# Patient Record
Sex: FEMALE | Race: BLACK OR AFRICAN AMERICAN | HISPANIC OR LATINO | Employment: FULL TIME | URBAN - METROPOLITAN AREA
[De-identification: names, ages, dates, MRNs, and addresses within clinical notes are randomized per-mention and may not be internally consistent; named-entity substitution may affect disease eponyms.]

---

## 2017-06-15 ENCOUNTER — ALLSCRIPTS OFFICE VISIT (OUTPATIENT)
Dept: OTHER | Facility: OTHER | Age: 22
End: 2017-06-15

## 2017-06-15 DIAGNOSIS — R35.0 FREQUENCY OF MICTURITION: ICD-10-CM

## 2017-06-15 DIAGNOSIS — Z01.419 ENCOUNTER FOR GYNECOLOGICAL EXAMINATION WITHOUT ABNORMAL FINDING: ICD-10-CM

## 2017-06-20 ENCOUNTER — APPOINTMENT (OUTPATIENT)
Dept: LAB | Facility: HOSPITAL | Age: 22
End: 2017-06-20
Payer: COMMERCIAL

## 2017-06-20 DIAGNOSIS — R35.0 FREQUENCY OF MICTURITION: ICD-10-CM

## 2017-06-20 LAB
ALBUMIN SERPL BCP-MCNC: 3.9 G/DL (ref 3.5–5)
ALP SERPL-CCNC: 112 U/L (ref 46–116)
ALT SERPL W P-5'-P-CCNC: 22 U/L (ref 12–78)
ANION GAP SERPL CALCULATED.3IONS-SCNC: 6 MMOL/L (ref 4–13)
AST SERPL W P-5'-P-CCNC: 14 U/L (ref 5–45)
BACTERIA UR QL AUTO: ABNORMAL /HPF
BASOPHILS # BLD AUTO: 0.02 THOUSANDS/ΜL (ref 0–0.1)
BASOPHILS NFR BLD AUTO: 0 % (ref 0–1)
BILIRUB SERPL-MCNC: 0.5 MG/DL (ref 0.2–1)
BILIRUB UR QL STRIP: NEGATIVE
BUN SERPL-MCNC: 18 MG/DL (ref 5–25)
CALCIUM SERPL-MCNC: 8.9 MG/DL (ref 8.3–10.1)
CHLORIDE SERPL-SCNC: 103 MMOL/L (ref 100–108)
CLARITY UR: CLEAR
CO2 SERPL-SCNC: 30 MMOL/L (ref 21–32)
COLOR UR: YELLOW
CREAT SERPL-MCNC: 0.71 MG/DL (ref 0.6–1.3)
EOSINOPHIL # BLD AUTO: 0.08 THOUSAND/ΜL (ref 0–0.61)
EOSINOPHIL NFR BLD AUTO: 1 % (ref 0–6)
ERYTHROCYTE [DISTWIDTH] IN BLOOD BY AUTOMATED COUNT: 11.9 % (ref 11.6–15.1)
EST. AVERAGE GLUCOSE BLD GHB EST-MCNC: 111 MG/DL
GFR SERPL CREATININE-BSD FRML MDRD: >60 ML/MIN/1.73SQ M
GLUCOSE P FAST SERPL-MCNC: 94 MG/DL (ref 65–99)
GLUCOSE UR STRIP-MCNC: NEGATIVE MG/DL
HBA1C MFR BLD: 5.5 % (ref 4.2–6.3)
HCT VFR BLD AUTO: 40.2 % (ref 34.8–46.1)
HGB BLD-MCNC: 14.6 G/DL (ref 11.5–15.4)
HGB UR QL STRIP.AUTO: NORMAL
KETONES UR STRIP-MCNC: NEGATIVE MG/DL
LEUKOCYTE ESTERASE UR QL STRIP: NEGATIVE
LYMPHOCYTES # BLD AUTO: 2.33 THOUSANDS/ΜL (ref 0.6–4.47)
LYMPHOCYTES NFR BLD AUTO: 42 % (ref 14–44)
MCH RBC QN AUTO: 33.2 PG (ref 26.8–34.3)
MCHC RBC AUTO-ENTMCNC: 36.3 G/DL (ref 31.4–37.4)
MCV RBC AUTO: 91 FL (ref 82–98)
MONOCYTES # BLD AUTO: 0.43 THOUSAND/ΜL (ref 0.17–1.22)
MONOCYTES NFR BLD AUTO: 8 % (ref 4–12)
NEUTROPHILS # BLD AUTO: 2.66 THOUSANDS/ΜL (ref 1.85–7.62)
NEUTS SEG NFR BLD AUTO: 49 % (ref 43–75)
NITRITE UR QL STRIP: NEGATIVE
NON-SQ EPI CELLS URNS QL MICRO: ABNORMAL /HPF
NRBC BLD AUTO-RTO: 0 /100 WBCS
PH UR STRIP.AUTO: 7 [PH] (ref 4.5–8)
PLATELET # BLD AUTO: 298 THOUSANDS/UL (ref 149–390)
PMV BLD AUTO: 9.8 FL (ref 8.9–12.7)
POTASSIUM SERPL-SCNC: 4.4 MMOL/L (ref 3.5–5.3)
PROT SERPL-MCNC: 7.8 G/DL (ref 6.4–8.2)
PROT UR STRIP-MCNC: NEGATIVE MG/DL
RBC # BLD AUTO: 4.4 MILLION/UL (ref 3.81–5.12)
RBC #/AREA URNS AUTO: ABNORMAL /HPF
SODIUM SERPL-SCNC: 139 MMOL/L (ref 136–145)
SP GR UR STRIP.AUTO: 1.01 (ref 1–1.03)
UROBILINOGEN UR QL STRIP.AUTO: 0.2 E.U./DL
WBC # BLD AUTO: 5.52 THOUSAND/UL (ref 4.31–10.16)
WBC #/AREA URNS AUTO: ABNORMAL /HPF

## 2017-06-20 PROCEDURE — 81001 URINALYSIS AUTO W/SCOPE: CPT

## 2017-06-20 PROCEDURE — 36415 COLL VENOUS BLD VENIPUNCTURE: CPT

## 2017-06-20 PROCEDURE — 80053 COMPREHEN METABOLIC PANEL: CPT

## 2017-06-20 PROCEDURE — 83036 HEMOGLOBIN GLYCOSYLATED A1C: CPT

## 2017-06-20 PROCEDURE — 85025 COMPLETE CBC W/AUTO DIFF WBC: CPT

## 2017-06-23 ENCOUNTER — GENERIC CONVERSION - ENCOUNTER (OUTPATIENT)
Dept: OTHER | Facility: OTHER | Age: 22
End: 2017-06-23

## 2017-07-05 ENCOUNTER — ALLSCRIPTS OFFICE VISIT (OUTPATIENT)
Dept: OTHER | Facility: OTHER | Age: 22
End: 2017-07-05

## 2017-07-05 ENCOUNTER — LAB REQUISITION (OUTPATIENT)
Dept: LAB | Facility: HOSPITAL | Age: 22
End: 2017-07-05
Payer: COMMERCIAL

## 2017-07-05 DIAGNOSIS — Z11.3 ENCOUNTER FOR SCREENING FOR INFECTIONS WITH PREDOMINANTLY SEXUAL MODE OF TRANSMISSION: ICD-10-CM

## 2017-07-05 PROCEDURE — 87491 CHLMYD TRACH DNA AMP PROBE: CPT | Performed by: NURSE PRACTITIONER

## 2017-07-05 PROCEDURE — 87591 N.GONORRHOEAE DNA AMP PROB: CPT | Performed by: NURSE PRACTITIONER

## 2017-07-05 PROCEDURE — G0145 SCR C/V CYTO,THINLAYER,RESCR: HCPCS | Performed by: NURSE PRACTITIONER

## 2017-07-07 LAB
CHLAMYDIA DNA CVX QL NAA+PROBE: NORMAL
N GONORRHOEA DNA GENITAL QL NAA+PROBE: NORMAL

## 2017-07-13 LAB
LAB AP GYN PRIMARY INTERPRETATION: NORMAL
Lab: NORMAL

## 2018-01-11 NOTE — RESULT NOTES
Discussion/Summary     All lab results are normal   No diabetes noted  No urine infection  Verified Results  (1) CBC/PLT/DIFF 20Jun2017 08:09AM [place] [facility]   TW Order Number: [id number]     Test Name Result Flag Reference   WBC COUNT 5 52 Thousand/uL  4 31-10 16   RBC COUNT 4 40 Million/uL  3 81-5 12   HEMOGLOBIN 14 6 g/dL  11 5-15 4   HEMATOCRIT 40 2 %  34 8-46  1   MCV 91 fL  82-98   MCH 33 2 pg  26 8-34 3   MCHC 36 3 g/dL  31 4-37 4   RDW 11 9 %  11 6-15 1   MPV 9 8 fL  8 9-12 7   PLATELET COUNT 580 Thousands/uL  149-390   nRBC AUTOMATED 0 /100 WBCs     NEUTROPHILS RELATIVE PERCENT 49 %  43-75   LYMPHOCYTES RELATIVE PERCENT 42 %  14-44   MONOCYTES RELATIVE PERCENT 8 %  4-12   EOSINOPHILS RELATIVE PERCENT 1 %  0-6   BASOPHILS RELATIVE PERCENT 0 %  0-1   NEUTROPHILS ABSOLUTE COUNT 2 66 Thousands/? ??L  1 85-7 62   LYMPHOCYTES ABSOLUTE COUNT 2 33 Thousands/? ??L  0 60-4 47   MONOCYTES ABSOLUTE COUNT 0 43 Thousand/? ??L  0 17-1 22   EOSINOPHILS ABSOLUTE COUNT 0 08 Thousand/? ??L  0 00-0 61   BASOPHILS ABSOLUTE COUNT 0 02 Thousands/? ??L  0 00-0 10     (1) COMPREHENSIVE METABOLIC PANEL [id number] 26:03CA [employer]   TW Order Number: [id number]     Test Name Result Flag Reference   SODIUM 139 mmol/L  136-145   POTASSIUM 4 4 mmol/L  3 5-5 3   CHLORIDE 103 mmol/L  100-108   CARBON DIOXIDE 30 mmol/L  21-32   ANION GAP (CALC) 6 mmol/L  4-13   BLOOD UREA NITROGEN 18 mg/dL  5-25   CREATININE 0 71 mg/dL  0 60-1 30   Standardized to IDMS reference method   CALCIUM 8 9 mg/dL  8 3-10 1   BILI, TOTAL 0 50 mg/dL  0 20-1 00   ALK PHOSPHATAS 112 U/L     ALT (SGPT) 22 U/L  12-78   AST(SGOT) 14 U/L  5-45   ALBUMIN 3 9 g/dL  3 5-5 0   TOTAL PROTEIN 7 8 g/dL  6 4-8 2   eGFR Non-African American      >60 0 ml/min/1 73sq m   [facility] Disease Education Program recommendations are as follows:  GFR calculation is accurate only with a steady state creatinine  Chronic Kidney disease less than 60 ml/min/1 73 sq  meters  Kidney failure less than 15 ml/min/1 73 sq  meters  GLUCOSE FASTING 94 mg/dL  65-99     (1) HEMOGLOBIN A1C 20Jun2017 08:09AM Yue Santos    Order Number: [id number]     Test Name Result Flag Reference   HEMOGLOBIN A1C 5 5 %  4 2-6 3   EST  AVG   GLUCOSE 111 mg/dl       (1) URINALYSIS w URINE C/S REFLEX (will reflex a microscopy if leukocytes, occult blood, or nitrites are not within normal limits) 20Jun2017 08:09AM Yue Santos    Order Number: [id number]     Test Name Result Flag Reference   COLOR Yellow     CLARITY Clear     PH UA 7 0  4 5-8 0   LEUKOCYTE ESTERASE UA Negative  Negative   NITRITE UA Negative  Negative   PROTEIN UA Negative mg/dl  Negative   GLUCOSE UA Negative mg/dl  Negative   KETONES UA Negative mg/dl  Negative   UROBILINOGEN UA 0 2 E U /dl  0 2, 1 0 E U /dl   BILIRUBIN UA Negative  Negative   BLOOD UA Trace-Intact  Negative, Trace-Intact   SPECIFIC GRAVITY UA 1 015  1 003-1 030   BACTERIA Occasional /hpf  None Seen, Occasional   EPITHELIAL CELLS Occasional /hpf  None Seen, Occasional   RBC UA 1-2 /hpf A None Seen   WBC UA 0-1 /hpf A None Seen       Signatures   Electronically signed by : BRAXTON Kennedy; Jun 23 2017  4:50PM EST                       (Author)

## 2018-01-13 VITALS
DIASTOLIC BLOOD PRESSURE: 70 MMHG | SYSTOLIC BLOOD PRESSURE: 103 MMHG | WEIGHT: 135 LBS | BODY MASS INDEX: 25.49 KG/M2 | HEIGHT: 61 IN

## 2018-01-13 VITALS
HEIGHT: 61 IN | BODY MASS INDEX: 25.52 KG/M2 | SYSTOLIC BLOOD PRESSURE: 110 MMHG | HEART RATE: 86 BPM | RESPIRATION RATE: 19 BRPM | WEIGHT: 135.19 LBS | TEMPERATURE: 97.3 F | DIASTOLIC BLOOD PRESSURE: 66 MMHG | OXYGEN SATURATION: 100 %

## 2018-01-13 NOTE — PROGRESS NOTES
Assessment    1  Encounter for preventive health examination (V70 0) (Z00 00)   2  Urinary frequency (788 41) (R35 0)   3  Myopia of right eye (367 1) (H52 11)   4  's permit physical examination (V70 3) (Z02 4)   5  Cervical cancer screening (V76 2) (Z12 4)    Plan  Cervical cancer screening    · *1 - [address] [facility] [facility]  *  Status: Hold For - Scheduling   Requested for: 90XJD4637  Care Summary provided  : Yes  Health Maintenance    · Call [phone] if: You find a new or different kind of lump in your breast ;  Status:Complete;   Done: 04TMH1997   · Call [phone] if: You have any warning signs of skin cancer ; Status:Complete;    Done: 90ABF9286   · Always use a seat belt and shoulder strap when riding or driving a motor vehicle ;  Status:Complete;   Done: 03CAK5830   · Begin a limited exercise program ; Status:Complete;   Done: 76KOR1159   · Begin or continue regular aerobic exercise   Gradually work up to at least 3 sessions of 30  minutes of exercise a week ; Status:Complete;   Done: 27ULR2070   · Brush your teeth 3 times a day and floss at least once a day ; Status:Complete;   Done:  78XAI3088   · Decreasing the stress in your life may help your condition improve ; Status:Complete;    Done: 51FNL4301   · Eat a low fat and low cholesterol diet ; Status:Complete;   Done: 77NLL1560   · Keep a diary of when and what you eat ; Status:Complete;   Done: 49EXC9038   · Regular aerobic exercise can help reduce stress ; Status:Complete;   Done: 49TYP6483   · Some eating tips that can help you lose weight ; Status:Complete;   Done: 38KWD4491   · Stretch and warm up your muscles during the first 10 minutes , then cool down your  muscles for the last 10 minutes of exercise ; Status:Complete;   Done: 15MUP9361   · There are many ways to reduce your risk of catching or spreading a sexually transmitted  Infection ; Status:Complete;   Done: 72YCF9439   · There are ways to decrease your stress and improve your sense of well-being  We  encourage you to keep active and exercise regularly  Make time to take care of yourself  and participate in activities that you enjoy  Stay connected to friends and family that can  support and comfort you  If at any time you have thoughts of harming yourself or  someone else, contact us immediately ; Status:Active; Requested [facility]:71RWL9405;    · Use a sun block product with an SPF of 15 or more ; Status:Complete;   Done:  03VWB9396   · Vitamins can help you get daily requirements that your diet may not be giving you ;  Status:Complete;   Done: 20XZR0078   · We encourage all of our patients to exercise regularly  30 minutes of exercise or physical  activity five or more days a week is recommended for children and adults ;  Status:Complete;   Done: 63RSI1528   · We recommend regular contraceptive use to prevent an unplanned pregnancy ;  Status:Complete;   Done: 90FKM3229   · We recommend routine visits to a dentist ; Status:Complete;   Done: 33JFC6823   · We recommend that you bring your body mass index down to 26 ; Status:Complete;    Done: 81KKT9047   · Follow-up visit in 1 year Evaluation and Treatment  Follow-up  Status: Hold For -  Scheduling  Requested for: 26IEH8726  Urinary frequency    · (1) CBC/PLT/DIFF; Status:Active; Requested VUA:05AIT5988;    · (1) COMPREHENSIVE METABOLIC PANEL; Status:Active; Requested QZV:48GQN3537;    · (1) HEMOGLOBIN A1C; Status:Active; Requested PAQ:32JXL5141;    · (1) URINALYSIS w URINE C/S REFLEX (will reflex a microscopy if leukocytes, occult  blood, or nitrites are not within normal limits); Status:Active; Requested VJC:18BPY3915;     Discussion/Summary  health maintenance visit Currently, she eats a poor diet and has an inadequate exercise regimen  referred Breast cancer screening: monthly self breast exam was advised and breast cancer screening is not indicated   Colorectal cancer screening: colorectal cancer screening is not indicated  Osteoporosis screening: bone mineral density testing is not indicated  The patient declines immunizations  She was advised to be evaluated by an ophthalmologist and a dentist  Advice and education were given regarding nutrition, weight bearing exercise, reproductive health and cardiovascular risk reduction  Patient discussion: discussed with the patient  Discussed importance of a healthy diet and exercise  Will check lab tests regarding urinary frequency with family hx of diabetes  Referred to gyn for annual exam   's permit exam form completed  Follow up as needed  The patient was counseled regarding instructions for management, risk factor reductions, patient and family education, impressions  Possible side effects of new medications were reviewed with the patient/guardian today  The treatment plan was reviewed with the patient/guardian  The patient/guardian understands and agrees with the treatment plan      Chief Complaint  18 y/o physical, DL permit      History of Present Illness  HM, Adult Female: The patient is being seen for a health maintenance evaluation  The last health maintenance visit was >3 year(s) ago  General Health: The patient's health since the last visit is described as good  She has regular dental visits  She complains of vision problems (RIGHT EYE PROBLEMS)  Vision care includes an eye examination within the last year  Lifestyle:  She does not have a healthy diet  She has weight concerns  She does not exercise regularly  She does not use tobacco  She denies alcohol use  She denies drug use  Reproductive health:  she reports abnormal menses  Menstrual history: The cycles are irregular  she uses contraception  For contraception, she uses hormone implants  she is sexually active  pregnancy history:  Screening:   HPI: Here for well visit and 's permit exam  Having some increased frequency of urination  Denies dysuria, itching, vaginal discharge  Review of Systems    Constitutional: No fever, no chills, feels well, no tiredness, no recent weight gain or weight loss  Eyes: No complaints of eye pain, no red eyes, no eyesight problems, no discharge, no dry eyes, no itching of eyes  ENT: no complaints of earache, no loss of hearing, no nose bleeds, no nasal discharge, no sore throat, no hoarseness  Cardiovascular: No complaints of slow heart rate, no fast heart rate, no chest pain, no palpitations, no leg claudication, no lower extremity edema  Respiratory: No complaints of shortness of breath, no wheezing, no cough, no SOB on exertion, no orthopnea, no PND  Gastrointestinal: No complaints of abdominal pain, no constipation, no nausea or vomiting, no diarrhea, no bloody stools  Genitourinary: dysmenorrhea and urinary frequency  Musculoskeletal: No complaints of arthralgias, no myalgias, no joint swelling or stiffness, no limb pain or swelling  Integumentary: No complaints of skin rash or lesions, no itching, no skin wounds, no breast pain or lump  Neurological: No complaints of headache, no confusion, no convulsions, no numbness, no dizziness or fainting, no tingling, no limb weakness, no difficulty walking  Psychiatric: Not suicidal, no sleep disturbance, no anxiety or depression, no change in personality, no emotional problems  Endocrine: No complaints of proptosis, no hot flashes, no muscle weakness, no deepening of the voice, no feelings of weakness  Hematologic/Lymphatic: No complaints of swollen glands, no swollen glands in the neck, does not bleed easily, does not bruise easily  Active Problems    1  Bipolar disorder (296 80) (F31 9)   2  Birth control (V25 9) (Z30 9)   3  Contraceptives (V25 02)   4  Nexplanon insertion (V25 5) (Z30 017)   5  Nexplanon removal (V25 43) (Z30 46)   6  Right eye symptoms (379 90) (H57 9)   7   Uterine scar from previous  delivery, antepartum condition or complication   (565 95) (O34 21)    Past Medical History    · History of Encounter for routine postpartum follow-up (V24 2) (Z39 2)   · History of Fetal Growth Problem Suspected, Not Found (V89 04)   · History of bipolar disorder (V11 1) (Z86 59)   · History of candidal vulvovaginitis (V13 29) (Z86 19)   · History of obesity (V13 89) (Z86 39)   · History of paranasal sinus pain (V12 69) (Z87 09)   · History of Visit For: Prenatal Exam High-risk Young Multigravida (V23 84)    Surgical History    · History of  Section   · Contraceptives (V25 02)    Family History  Mother    · Family history of Diabetes Mellitus (V18 0)   · Denied: Family history of substance abuse  Father    · Denied: Family history of substance abuse   · Family history of Feeling Fine  Daughter    · Family history of Feeling Fine   · Family history of Feeling Fine  Sister    · Family history of Feeling Fine  Brother    · Family history of Diabetes Mellitus (V18 0)   · Family history of mental retardation (V18 4) (Z81 0)   · Family history of Feeling Fine   · Family history of Feeling Fine  Family History    · Family history of Diabetes Mellitus (V18 0)    Social History    · Denied: History of Alcohol Use (History)   · Has 2 children   · Never A Smoker   · No alcohol use   · No drug use   · No preference on Buddhist beliefs    Current Meds   1  Prenatal TABS; Therapy: ([facility]) to Recorded    Allergies    1  No Known Drug Allergies    2  No Known Environmental Allergies   3  No Known Food Allergies    Vitals   Recorded: 86XEF2683 05:58PM   Temperature 97 3 F   Heart Rate 86   Respiration 19   Systolic 031   Diastolic 66   Height 5 ft 1 in   Weight 135 lb 3 oz   BMI Calculated 25 54   BSA Calculated 1 6   O2 Saturation 100   LMP 73Vdx6007     Physical Exam    Constitutional   General appearance: No acute distress, well appearing and well nourished  Eyes   Conjunctiva and lids: No swelling, erythema or discharge      Pupils and irises: Equal, round and reactive to light  Ears, Nose, Mouth, and Throat   External inspection of ears and nose: Normal     Otoscopic examination: Tympanic membranes translucent with normal light reflex  Canals patent without erythema  Oropharynx: Normal with no erythema, edema, exudate or lesions  Pulmonary   Respiratory effort: No increased work of breathing or signs of respiratory distress  Auscultation of lungs: Clear to auscultation  Cardiovascular   Palpation of heart: Normal PMI, no thrills  Auscultation of heart: Normal rate and rhythm, normal S1 and S2, without murmurs  Examination of extremities for edema and/or varicosities: Normal     Abdomen   Abdomen: Non-tender, no masses  Liver and spleen: No hepatomegaly or splenomegaly  Lymphatic   Palpation of lymph nodes in neck: No lymphadenopathy  Musculoskeletal   Gait and station: Normal     Digits and nails: Normal without clubbing or cyanosis  Inspection/palpation of joints, bones, and muscles: Normal     Skin   Skin and subcutaneous tissue: Normal without rashes or lesions  Skin piercing was noted of the umbilicus and tongue  Neurologic   Cranial nerves: Cranial nerves 2-12 intact  Reflexes: 2+ and symmetric  Sensation: No sensory loss      Psychiatric   Orientation to person, place, and time: Normal     Mood and affect: Normal        Signatures   Electronically signed by : Paige Paul; Jun 16 2017  5:03AM EST                       (Author)    Electronically signed by : RAMIN Piña ; Jul 5 2017  2:31PM EST

## 2018-05-03 NOTE — PROGRESS NOTES
ASSESSMENT & PLAN: Wilma Castañeda is a 23 y o   here for Nexplanon removal      1  Nexplanon removed (left arm)   - Successfully removed in office  2  Desire for future pregnancy   - Encouraged PNV and regular unprotected intercourse  3  RTC   - F/u as needed for next annual exam    CC:  Annual Gynecologic Examination    HPI: Wilma Castañeda is a 23 y o  Merthjeffrey Drown (prior C/S x2) who presents for Nexplanon removal in her left arm  This is her 2nd Nexplanon and it was placed 16  She is getting it removed because she desires to have a 3rd child  She currently has 2 girls  History reviewed  No pertinent past medical history  History reviewed  No pertinent surgical history  Past OB/Gyn History:  OB History     No data available          Family History   Problem Relation Age of Onset    Diabetes Mother        Social History:  Social History     Social History    Marital status: Single     Spouse name: N/A    Number of children: N/A    Years of education: N/A     Occupational History    Not on file  Social History Main Topics    Smoking status: Never Smoker    Smokeless tobacco: Never Used    Alcohol use No    Drug use: No    Sexual activity: Yes     Partners: Male     Birth control/ protection: Implant      Comment: wishing to conceive     Other Topics Concern    Not on file     Social History Narrative    No narrative on file     Allergies   Allergen Reactions    Iodinated Diagnostic Agents Hives    Percocet [Oxycodone-Acetaminophen] Rash       Current Outpatient Prescriptions:     Etonogestrel (NEXPLANON) 76 MG IMPL, Inject under the skin, Disp: , Rfl:     Review of Systems:  A complete review of systems was performed and was negative, except as listed      Objective      /74   Wt 66 2 kg (146 lb)   LMP 2018   BMI 27 59 kg/m²     Remove and insert drug implant  Date/Time: 2018 10:43 AM  Performed by: Alexa Aguilar by: Verta First     Consent: Consent obtained:  Verbal and written    Consent given by:  Patient    Procedural risks discussed:  Damage to other organs, bleeding, death, failure rate, infection, repeat procedure, possible loss of function, possible conversion to open surgery and possible continued pain (permanent and or temporary damage to the left arm, digits, paresthesias, skin, hematoma formation, ecchymosis, paralysis)    Patient questions answered: yes      Patient agrees, verbalizes understanding, and wants to proceed: yes      Educational handouts given: yes      Instructions and paperwork completed: yes    Indication:     Indication: Presence of non-biodegradable drug delivery implant    Pre-procedure:     Pre-procedure timeout performed: yes      Prepped with: alcohol 70% and povidone-iodine      Local anesthetic:  Lidocaine 1%    The site was cleaned and prepped in a sterile fashion: yes    Procedure:     Procedure:  Removal    Left/right:  Left    Site was closed with steri-strips and pressure bandage applied: yes    Comments: The Nexplanon device was palpaped in the patient's left upper forearm  The skin was prepped with Alcohol swabs and Betadine  Lidocaine 1% was used to numb the skin superficially, above the Nexplanon device  A 5mm incision was made  The Nexplanon was pushed distally towards the incision but was not visualized  A second 5mm incision 1cm proximal to the first incision was made  The device was then visualized and removed easily with a hemostat  The incisions were cleaned again, dried, and steristrips were placed over the incisions and adequate hemostasis was noted  A pressure dressing was applied with bandage and gauged  The patient tolerated the procedure well and was advised to call if she had any problems such as hematoma formation, bruising, fevers, chills, N&V, pain, and or/bleeding  She was instructed to keep the bandage on for 24h and take tylenol and/or motrin for swelling and pain      Dr Flaco Long was present and assisted with the procedure      Jaki Carney DO  PGY-1 OB/GYN   5/8/2018 10:50 AM

## 2018-05-08 ENCOUNTER — OFFICE VISIT (OUTPATIENT)
Dept: OBGYN CLINIC | Facility: CLINIC | Age: 23
End: 2018-05-08
Payer: COMMERCIAL

## 2018-05-08 VITALS — DIASTOLIC BLOOD PRESSURE: 74 MMHG | SYSTOLIC BLOOD PRESSURE: 110 MMHG | BODY MASS INDEX: 27.59 KG/M2 | WEIGHT: 146 LBS

## 2018-05-08 DIAGNOSIS — Z30.46 ENCOUNTER FOR NEXPLANON REMOVAL: Primary | ICD-10-CM

## 2018-05-08 PROBLEM — H52.11 MYOPIA OF RIGHT EYE: Status: ACTIVE | Noted: 2017-06-15

## 2018-05-08 PROBLEM — R35.0 INCREASED FREQUENCY OF URINATION: Status: ACTIVE | Noted: 2017-06-15

## 2018-05-08 PROCEDURE — 11982 REMOVE DRUG IMPLANT DEVICE: CPT | Performed by: OBSTETRICS & GYNECOLOGY

## 2018-11-13 ENCOUNTER — ULTRASOUND (OUTPATIENT)
Dept: OBGYN CLINIC | Facility: CLINIC | Age: 23
End: 2018-11-13
Payer: COMMERCIAL

## 2018-11-13 VITALS
HEART RATE: 81 BPM | SYSTOLIC BLOOD PRESSURE: 113 MMHG | DIASTOLIC BLOOD PRESSURE: 68 MMHG | BODY MASS INDEX: 24.03 KG/M2 | WEIGHT: 127.2 LBS

## 2018-11-13 DIAGNOSIS — N91.2 AMENORRHEA: Primary | ICD-10-CM

## 2018-11-13 DIAGNOSIS — O21.9 NAUSEA AND VOMITING IN PREGNANCY: ICD-10-CM

## 2018-11-13 DIAGNOSIS — Z3A.08 8 WEEKS GESTATION OF PREGNANCY: ICD-10-CM

## 2018-11-13 LAB — SL AMB POCT URINE HCG: POSITIVE

## 2018-11-13 PROCEDURE — 99214 OFFICE O/P EST MOD 30 MIN: CPT | Performed by: OBSTETRICS & GYNECOLOGY

## 2018-11-13 PROCEDURE — 81025 URINE PREGNANCY TEST: CPT | Performed by: OBSTETRICS & GYNECOLOGY

## 2018-11-13 RX ORDER — DIPHENHYDRAMINE HYDROCHLORIDE 25 MG/1
25 CAPSULE ORAL 3 TIMES DAILY PRN
Qty: 30 TABLET | Refills: 3 | Status: SHIPPED | OUTPATIENT
Start: 2018-11-13 | End: 2022-02-18

## 2018-11-13 NOTE — PATIENT INSTRUCTIONS
Acute Nausea and Vomiting   WHAT YOU NEED TO KNOW:   Acute nausea and vomiting start suddenly, worsen quickly, and last a short time  DISCHARGE INSTRUCTIONS:   Seek care immediately if:   · You see blood in your vomit or your bowel movements  · You have sudden, severe pain in your chest and upper abdomen after hard vomiting or retching  · You have swelling in your neck and chest      · You are dizzy, cold, and thirsty and your eyes and mouth are dry  · You are urinating very little or not at all  · You have muscle weakness, leg cramps, and trouble breathing  · Your heart is beating much faster than normal      · You continue to vomit for more than 48 hours  Contact your healthcare provider if:   · You have frequent dry heaves (vomiting but nothing comes out)  · Your nausea and vomiting does not get better or go away after you use medicine  · You have questions or concerns about your condition or treatment  Medicines: You may need any of the following:  · Medicines  may be given to calm your stomach and stop your vomiting  You may also need medicines to help you feel more relaxed or to stop nausea and vomiting caused by motion sickness  · Gastrointestinal stimulants  are used to help empty your stomach and bowels  This may help decrease nausea and vomiting  · Take your medicine as directed  Contact your healthcare provider if you think your medicine is not helping or if you have side effects  Tell him or her if you are allergic to any medicine  Keep a list of the medicines, vitamins, and herbs you take  Include the amounts, and when and why you take them  Bring the list or the pill bottles to follow-up visits  Carry your medicine list with you in case of an emergency  Prevent or manage acute nausea and vomiting:   · Do not drink alcohol  Alcohol may upset or irritate your stomach  Too much alcohol can also cause acute nausea and vomiting  · Control stress    Headaches due to stress may cause nausea and vomiting  Find ways to relax and manage your stress  Get more rest and sleep  · Drink more liquids as directed  Vomiting can lead to dehydration  It is important to drink more liquids to help replace lost body fluids  Ask your healthcare provider how much liquid to drink each day and which liquids are best for you  Your provider may recommend that you drink an oral rehydration solution (ORS)  ORS contains water, salts, and sugar that are needed to replace the lost body fluids  Ask what kind of ORS to use, how much to drink, and where to get it  · Eat smaller meals, more often  Eat small amounts of food every 2 to 3 hours, even if you are not hungry  Food in your stomach may decrease your nausea  · Talk to your healthcare provider before you take over-the-counter (OTC) medicines  These medicines can cause serious problems if you use certain other medicines, or you have a medical condition  You may have problems if you use too much or use them for longer than the label says  Follow directions on the label carefully  Follow up with your healthcare provider as directed:  Write down your questions so you remember to ask them during your visits  © 2017 [facility] Information is for End User's use only and may not be sold, redistributed or otherwise used for commercial purposes  All illustrations and images included in CareNotes® are the copyrighted property of [employer] , [employer]  or Babar Harrison  The above information is an  only  It is not intended as medical advice for individual conditions or treatments  Talk to your doctor, nurse or pharmacist before following any medical regimen to see if it is safe and effective for you

## 2018-11-13 NOTE — PROGRESS NOTES
Jasmina Colon is a 23 y o   @ [address] [place] [address] by LMP 9/15/18 MALATHI 18    Chief complaint today: nausea, here for viability scan, desires permanent sterilization in future    Vitals:    18 0817   BP: 113/68   Pulse: 81     Viability scan  FHR 188bpm, yolk scan, gestational sac and IUP visualized  CRL 1 58-1 77cm consistent with an 8w0d gestation  Will date via LMP as only 2d difference  B6 and [employer], PNV to pt's pharmacy  To return in 1 week for prenatal intake    Previous C-S x2, briefly discussed need for a 3rd c/s    Bipin Oneal,   PGY-2 OB/GYN   2018 8:43 AM

## 2018-11-26 ENCOUNTER — INITIAL PRENATAL (OUTPATIENT)
Dept: OBGYN CLINIC | Facility: CLINIC | Age: 23
End: 2018-11-26
Payer: COMMERCIAL

## 2018-11-26 ENCOUNTER — APPOINTMENT (OUTPATIENT)
Dept: LAB | Facility: HOSPITAL | Age: 23
End: 2018-11-26
Payer: COMMERCIAL

## 2018-11-26 VITALS
BODY MASS INDEX: 24.47 KG/M2 | WEIGHT: 129.6 LBS | HEART RATE: 93 BPM | SYSTOLIC BLOOD PRESSURE: 109 MMHG | DIASTOLIC BLOOD PRESSURE: 74 MMHG | HEIGHT: 61 IN

## 2018-11-26 DIAGNOSIS — Z3A.10 10 WEEKS GESTATION OF PREGNANCY: ICD-10-CM

## 2018-11-26 DIAGNOSIS — Z3A.10 10 WEEKS GESTATION OF PREGNANCY: Primary | ICD-10-CM

## 2018-11-26 LAB
ABO GROUP BLD: NORMAL
BACTERIA UR QL AUTO: ABNORMAL /HPF
BASOPHILS # BLD AUTO: 0.03 THOUSANDS/ΜL (ref 0–0.1)
BASOPHILS NFR BLD AUTO: 0 % (ref 0–1)
BILIRUB UR QL STRIP: NEGATIVE
BLD GP AB SCN SERPL QL: NEGATIVE
CLARITY UR: CLEAR
COLOR UR: YELLOW
EOSINOPHIL # BLD AUTO: 0.06 THOUSAND/ΜL (ref 0–0.61)
EOSINOPHIL NFR BLD AUTO: 1 % (ref 0–6)
ERYTHROCYTE [DISTWIDTH] IN BLOOD BY AUTOMATED COUNT: 12.2 % (ref 11.6–15.1)
GLUCOSE 1H P 50 G GLC PO SERPL-MCNC: 88 MG/DL
GLUCOSE UR STRIP-MCNC: NEGATIVE MG/DL
HBV SURFACE AG SER QL: NORMAL
HCT VFR BLD AUTO: 39.6 % (ref 34.8–46.1)
HGB BLD-MCNC: 13.5 G/DL (ref 11.5–15.4)
HGB UR QL STRIP.AUTO: ABNORMAL
IMM GRANULOCYTES # BLD AUTO: 0.04 THOUSAND/UL (ref 0–0.2)
IMM GRANULOCYTES NFR BLD AUTO: 1 % (ref 0–2)
KETONES UR STRIP-MCNC: NEGATIVE MG/DL
LEUKOCYTE ESTERASE UR QL STRIP: NEGATIVE
LYMPHOCYTES # BLD AUTO: 2.15 THOUSANDS/ΜL (ref 0.6–4.47)
LYMPHOCYTES NFR BLD AUTO: 27 % (ref 14–44)
MCH RBC QN AUTO: 32.5 PG (ref 26.8–34.3)
MCHC RBC AUTO-ENTMCNC: 34.1 G/DL (ref 31.4–37.4)
MCV RBC AUTO: 95 FL (ref 82–98)
MONOCYTES # BLD AUTO: 0.63 THOUSAND/ΜL (ref 0.17–1.22)
MONOCYTES NFR BLD AUTO: 8 % (ref 4–12)
NEUTROPHILS # BLD AUTO: 4.96 THOUSANDS/ΜL (ref 1.85–7.62)
NEUTS SEG NFR BLD AUTO: 63 % (ref 43–75)
NITRITE UR QL STRIP: NEGATIVE
NON-SQ EPI CELLS URNS QL MICRO: ABNORMAL /HPF
NRBC BLD AUTO-RTO: 0 /100 WBCS
PH UR STRIP.AUTO: 6 [PH] (ref 4.5–8)
PLATELET # BLD AUTO: 308 THOUSANDS/UL (ref 149–390)
PMV BLD AUTO: 9.6 FL (ref 8.9–12.7)
PROT UR STRIP-MCNC: NEGATIVE MG/DL
RBC # BLD AUTO: 4.16 MILLION/UL (ref 3.81–5.12)
RBC #/AREA URNS AUTO: ABNORMAL /HPF
RH BLD: POSITIVE
RUBV IGG SERPL IA-ACNC: 10.8 IU/ML
SP GR UR STRIP.AUTO: 1.01 (ref 1–1.03)
SPECIMEN EXPIRATION DATE: NORMAL
UROBILINOGEN UR QL STRIP.AUTO: 1 E.U./DL
WBC # BLD AUTO: 7.87 THOUSAND/UL (ref 4.31–10.16)
WBC #/AREA URNS AUTO: ABNORMAL /HPF

## 2018-11-26 PROCEDURE — 36415 COLL VENOUS BLD VENIPUNCTURE: CPT

## 2018-11-26 PROCEDURE — 82950 GLUCOSE TEST: CPT

## 2018-11-26 PROCEDURE — 81001 URINALYSIS AUTO W/SCOPE: CPT

## 2018-11-26 PROCEDURE — T1001 NURSING ASSESSMENT/EVALUATN: HCPCS

## 2018-11-26 PROCEDURE — 87086 URINE CULTURE/COLONY COUNT: CPT

## 2018-11-26 PROCEDURE — 83020 HEMOGLOBIN ELECTROPHORESIS: CPT

## 2018-11-26 PROCEDURE — 80081 OBSTETRIC PANEL INC HIV TSTG: CPT

## 2018-11-26 NOTE — PROGRESS NOTES
OB Intake  o Patient presents for OB intake interview  o Accompanied by: Self  o   o Hx of  delivery prior to 36 weeks 6 days: no  o LMP: Patient's last menstrual period was 2018   o U/S date: 18   - 8 weeks  0 days  o Estimated Date of Delivery: 19   - confirmed by U/S  o Signs and Symptoms of pregnancy:  - nausea  - Constipation: no  - Headaches: no  - Cramping/spotting: no  - PICA cravings: no  - Diabetes: If you answer yes, please order 1 hr gtt testing, 50grams   History of gestational diabetes yes   BMI >35  no   Advance maternal age >35 no   First degree relative with type 2 diabetes yes   History of PCOS no   Current metformin use no   Prior history of macrosomia or LGA no  o Immunization Record  -   - There is no immunization history on file for this patient   o Tdap:  - Counseled to be given after 28 weeks  - Influenza vaccine discussed   Vaccinated: No  o MRSA questionnaire: negative  o Dental visit within last 6 months  - yes    Nurse Family Partnership: No  ONAF form submitted: No    Interview education:  Ashok Moore Pregnancy Essentials booklet given to patient  Reviewed and explained   Handouts given at todays visit  o [employer] phone application guide  o [facility] support center  o CDCs Response to [address] [facility] [address] [place] [facility]  - Sequential screening pamphlet  - Cystic fibrosis pamphlet  o MALATHI letter given  [facility] [address]  - Work         Scripts given for prenatal panel, hemoglobin electrophoresis, and 1 hour glucose    Patient denied genetic testing    Undecided on influenza vaccine   CDC brochure provided to patient        Interview done by: Carlton Noble RN 18

## 2018-11-26 NOTE — PATIENT INSTRUCTIONS
Pregnancy   WHAT YOU NEED TO KNOW:   What do I need to know about pregnancy? A normal pregnancy lasts about 40 weeks  The first trimester lasts from your last period through the 12th week of pregnancy  The second trimester lasts from the 13th week of your pregnancy through the 23rd week  The third trimester lasts from your 24th week of pregnancy until your baby is born  If you know the date of your last period, your healthcare provider can estimate your due date  You may give birth to your baby any time from 37 weeks to 2 weeks after your due date  What is prenatal care? Prenatal care is a series of visits with your healthcare provider throughout your pregnancy  Prenatal care can help prevent problems during pregnancy and childbirth  At each prenatal visit, your healthcare provider will weigh you and check your blood pressure  Your healthcare provider will also check your baby's heartbeat and growth  You may also need the following at some visits:  · A pelvic exam  allows your healthcare provider to see your cervix (the bottom part of your uterus)  Your healthcare provider uses a speculum to gently open your vagina  He will check the size and shape of your uterus  · Blood tests  may be done to check for gestational diabetes and anemia (low iron level)  You may need other blood tests, such as blood type, Rh factor, or tests to check for birth defects  · A fetal ultrasound  shows pictures of your baby inside your uterus  It shows your baby's development  The movement and position of your baby can also be seen  Your healthcare provider may be able to tell you what your baby's gender is during the ultrasound  What can I do to have a healthy pregnancy? · Eat a variety of healthy foods  Healthy foods include fruits, vegetables, whole-grain breads, low-fat dairy foods, beans, lean meats, and fish  Drink liquids as directed  Ask how much liquid to drink each day and which liquids are best for you   Limit caffeine to less than 200 milligrams each day  Limit your intake of fish to 2 servings each week  Choose fish low in mercury such as canned light tuna, shrimp, crab, salmon, cod, or tilapia  Do not  eat fish high in mercury such as swordfish, tilefish, sarah mackerel, and shark  · Take prenatal vitamins as directed  Your need for certain vitamins and minerals, such as folic acid, increases during pregnancy  Prenatal vitamins provide some of the extra vitamins and minerals you need  Prenatal vitamins may also help to decrease the risk of certain birth defects  · Ask how much weight you should gain during your pregnancy  Too much or too little weight gain can be unhealthy for you and your baby  · Talk to your healthcare provider about exercise  Moderate exercise can help you stay fit  Your healthcare provider will help you plan an exercise program that is safe for you during pregnancy  · Do not smoke  If you smoke, it is never too late to quit  Smoking increases your risk of a miscarriage and other health problems during your pregnancy  Smoking can cause your baby to be born too early or weigh less at birth  Ask your healthcare provider for information if you need help quitting  · Do not drink alcohol  Alcohol passes from your body to your baby through the placenta  It can affect your baby's brain development and cause fetal alcohol syndrome (FAS)  FAS is a group of conditions that causes mental, behavior, and growth problems  · Talk to your healthcare provider before you take any medicines  Many medicines may harm your baby if you take them when you are pregnant  Do not take any medicines, vitamins, herbs, or supplements without first talking to your healthcare provider  Never use illegal or street drugs (such as marijuana or cocaine) while you are pregnant  What body changes may happen during my pregnancy?    · Breast changes  you will experience include tenderness and tingling during the early part of your pregnancy  Your breasts will become larger  You may need to use a support bra  You may see a thin, yellow fluid, called colostrum, leak from your nipples during the second trimester  Colostrum is a liquid that changes to milk about 3 days after you give birth  · Skin changes and stretch marks  may occur during your pregnancy  You may have red marks, called stretch marks, on your skin  Stretch marks will usually fade after pregnancy  Use lotion if your skin is dry and itchy  The skin on your face, around your nipples, and below your belly button may darken  Most of the time, your skin will return to its normal color after your baby is born  · Morning sickness  is nausea and vomiting that can happen at any time of day  Avoid fatty and spicy foods  Eat small meals throughout the day instead of large meals  Sadie may help to decrease nausea  Ask your healthcare provider about other ways of decreasing nausea and vomiting  · Heartburn  may be caused by changes in your hormones during pregnancy  Your growing uterus may also push your stomach upward and force stomach acid to back up into your esophagus  Eat 4 or 5 small meals each day instead of large meals  Avoid spicy foods  Avoid eating right before bedtime  · Constipation  may develop during your pregnancy  To treat constipation, eat foods high in fiber such as fiber cereals, beans, fruits, vegetables, whole-grain breads, and prune juice  Get regular exercise and drink plenty of water  Your healthcare provider may also suggest a fiber supplement to soften your bowel movements  Talk to your healthcare provider before you use any medicines to decrease constipation  · Hemorrhoids  are enlarged veins in the rectal area  They may cause pain, itching, and bright red bleeding from your rectum  To decrease your risk of hemorrhoids, prevent constipation and do not strain to have a bowel movement   If you have hemorrhoids, soak in a tub of warm water to ease discomfort  Ask your healthcare provider how you can treat hemorrhoids  · Leg cramps and swelling  may be caused by low calcium levels or the added weight of pregnancy  Raise your legs above the level of your heart to decrease swelling  During a leg cramp, stretch or massage the muscle that has the cramp  Heat may help decrease pain and muscle spasms  Apply heat on your muscle for 20 to 30 minutes every 2 hours for as many days as directed  · Back pain  may occur as your baby grows  Do not stand for long periods of time or lift heavy items  Use good posture while you stand, squat, or bend  Wear low-heeled shoes with good support  Rest may also help to relieve back pain  Ask your healthcare provider about exercises you can do to strengthen your back muscles  What are some safety tips during pregnancy? · Avoid hot tubs and saunas  Do not use a hot tub or sauna while you are pregnant, especially during your first trimester  Hot tubs and saunas may raise your baby's temperature and increase the risk of birth defects  · Avoid toxoplasmosis  This is an infection caused by eating raw meat or being around infected cat feces  It can cause birth defects, miscarriages, and other problems  Wash your hands after you touch raw meat  Make sure any meat is well-cooked before you eat it  Avoid raw eggs and unpasteurized milk  Use gloves or ask someone else to clean your cat's litter box while you are pregnant  · Ask your healthcare provider about travel  The most comfortable time to travel is during the second trimester  Ask your healthcare provider if you can travel after 36 weeks  You may not be able to travel in an airplane after 36 weeks  He may also recommend that you avoid long road trips  When should I seek immediate care? · You develop a severe headache that does not go away  · You have new or increased vision changes, such as blurred or spotted vision      · You have new or increased swelling in your face or hands  · You have pain or cramping in your abdomen or low back  · You have vaginal bleeding  When should I contact my healthcare provider? · You have abdominal cramps, pressure, or tightening  · You have a change in vaginal discharge  · You cannot keep food or drinks down, and you are losing weight  · You have chills or a fever  · You have vaginal itching, burning, or pain  · You have yellow, green, white, or foul-smelling vaginal discharge  · You have pain or burning when you urinate, less urine than usual, or pink or bloody urine  · You have questions or concerns about your condition or care  CARE AGREEMENT:   You have the right to help plan your care  Learn about your health condition and how it may be treated  Discuss treatment options with your caregivers to decide what care you want to receive  You always have the right to refuse treatment  The above information is an  only  It is not intended as medical advice for individual conditions or treatments  Talk to your doctor, nurse or pharmacist before following any medical regimen to see if it is safe and effective for you  © 2017 [address]  Information is for End User's use only and may not be sold, redistributed or otherwise used for commercial purposes  All illustrations and images included in CareNotes® are the copyrighted property of [employer]  or Babar Harrison

## 2018-11-27 LAB — RPR SER QL: NORMAL

## 2018-11-28 LAB
BACTERIA UR CULT: NORMAL
DEPRECATED HGB OTHER BLD-IMP: 0 %
HGB A MFR BLD: 2.4 % (ref 1.8–3.2)
HGB A MFR BLD: 97.6 % (ref 96.4–98.8)
HGB C MFR BLD: 0 %
HGB F MFR BLD: 0 % (ref 0–2)
HGB FRACT BLD-IMP: NORMAL
HGB S BLD QL SOLY: NEGATIVE
HGB S MFR BLD: 0 %
HIV 1+2 AB+HIV1 P24 AG SERPL QL IA: NORMAL

## 2018-12-06 ENCOUNTER — ROUTINE PRENATAL (OUTPATIENT)
Dept: OBGYN CLINIC | Facility: CLINIC | Age: 23
End: 2018-12-06
Payer: COMMERCIAL

## 2018-12-06 VITALS — SYSTOLIC BLOOD PRESSURE: 114 MMHG | WEIGHT: 129 LBS | BODY MASS INDEX: 24.37 KG/M2 | DIASTOLIC BLOOD PRESSURE: 75 MMHG

## 2018-12-06 DIAGNOSIS — Z34.91 PRENATAL CARE IN FIRST TRIMESTER: Primary | ICD-10-CM

## 2018-12-06 DIAGNOSIS — Z3A.11 11 WEEKS GESTATION OF PREGNANCY: ICD-10-CM

## 2018-12-06 PROCEDURE — 99214 OFFICE O/P EST MOD 30 MIN: CPT | Performed by: OBSTETRICS & GYNECOLOGY

## 2018-12-06 PROCEDURE — 87591 N.GONORRHOEAE DNA AMP PROB: CPT | Performed by: OBSTETRICS & GYNECOLOGY

## 2018-12-06 PROCEDURE — 87491 CHLMYD TRACH DNA AMP PROBE: CPT | Performed by: OBSTETRICS & GYNECOLOGY

## 2018-12-06 NOTE — PROGRESS NOTES
OB/GYN  PRENATAL H&P VISIT  Theodore Bailey  2018  10:03 AM      SUBJECTIVE  23yo  at 11w5d  Patient is here for initial prenatal H&P  This is an unintended pregnancy  Patient reports that her LMP was 9/15/18  Patient is currently doing well, had some nausea and vomiting that has improved now  She is here with her two children  She has a hx of 2 previous c-sections, 5y and 7y ago  She currently works at [employer] as a cook  She has a support system at home  She denies hx of STD/STI, denies a hx of TB or close contacts with persons with TB  She denies a family history of inheritable conditions such as physical or intellectual disabilities, birth defects, blood disorders, heart or neural tube defects  She never had MRSA  She denies recent travel or travel planned in the near future  She denies use of nicotine or recreational drug use  She denies vaginal bleeding, cramping, leakage, abnormal discharge  Patient is interested in a tubal ligation during her repeat   Paperwork not signed today since MALATHI is end of   Review of Systems   Gastrointestinal: Negative for abdominal pain, nausea and vomiting  Genitourinary: Negative for vaginal bleeding and vaginal pain  Neurological: Negative for headaches  Past Medical History:   Diagnosis Date    Varicella     as child        No past surgical history on file  Social History     Social History    Marital status: Single     Spouse name: N/A    Number of children: N/A    Years of education: N/A     Occupational History    Not on file       Social History Main Topics    Smoking status: Never Smoker    Smokeless tobacco: Never Used    Alcohol use No    Drug use: No    Sexual activity: Yes     Partners: Male     Birth control/ protection: Implant      Comment: wishing to conceive     Other Topics Concern    Not on file     Social History Narrative    No narrative on file       OBJECTIVE  Vitals:    18 0928   BP: 114/75 Physical Exam   Constitutional: She is oriented to person, place, and time  She appears well-developed and well-nourished  No distress  HENT:   Head: Normocephalic and atraumatic  Cardiovascular: Normal rate, regular rhythm and normal heart sounds  Pulmonary/Chest: Effort normal and breath sounds normal  No respiratory distress  She has no wheezes  Abdominal: Soft  She exhibits no distension  Neurological: She is alert and oriented to person, place, and time  Vulva: normal  Vagina: normal mucosa, normal discharge  Cervix: no cervical motion tenderness and no lesions       ASSESSMENT AND PLAN    23 y o , [id number], with /75   Wt 58 5 kg (129 lb)   LMP 09/15/2018 (Exact Date) , at [address] here for her prenatal H&P       1  Pregnancy: H&P completed today  PN Labs reviewed today  Final dating via LMP  Labor expectations discussed with patient, including appointment schedule, nutrition, weight gain, exercise, medications, sexual intercourse, and nausea/vomiting  2  Screening: Pap smear not collected, she had one less than 3 yrs ago, NILM  GC/CT collected  Sequential screening reviewed with patient - will proceed with undecided  Reviewed carrier screening for cystic fibrosis, hemoglobinapathies, Fragile X, and somatic muscular atrophy  3  Consents: Delivery process including potential OVD and  reviewed  Consents signed today  4  Labor: Patient to be scheduled for repeat   5  Postpartum: Patient plans on breastfeeding  Different methods of contraception were discussed with patient, including progesterone only oral pills, depo provera, nexplanon, mirena, and paragard  Patient would like to use BTL during postpartum phase  6  Follow up: RTC in 4 weeks  Precautions regarding labor, leakage, bleeding, and fetal movement reviewed      D/w Dr Hoa Rosas MD  2018  10:03 AM

## 2018-12-07 LAB
C TRACH DNA SPEC QL NAA+PROBE: NEGATIVE
N GONORRHOEA DNA SPEC QL NAA+PROBE: NEGATIVE

## 2019-01-09 ENCOUNTER — ROUTINE PRENATAL (OUTPATIENT)
Dept: OBGYN CLINIC | Facility: CLINIC | Age: 24
End: 2019-01-09

## 2019-01-09 VITALS
SYSTOLIC BLOOD PRESSURE: 109 MMHG | DIASTOLIC BLOOD PRESSURE: 70 MMHG | BODY MASS INDEX: 25.32 KG/M2 | WEIGHT: 134 LBS | HEART RATE: 91 BPM

## 2019-01-09 DIAGNOSIS — Z3A.16 16 WEEKS GESTATION OF PREGNANCY: Primary | ICD-10-CM

## 2019-01-09 DIAGNOSIS — Z34.92 PRENATAL CARE IN SECOND TRIMESTER: ICD-10-CM

## 2019-01-09 PROBLEM — R35.0 INCREASED FREQUENCY OF URINATION: Status: RESOLVED | Noted: 2017-06-15 | Resolved: 2019-01-09

## 2019-01-09 PROBLEM — Z30.46 ENCOUNTER FOR NEXPLANON REMOVAL: Status: RESOLVED | Noted: 2018-05-08 | Resolved: 2019-01-09

## 2019-01-09 PROCEDURE — 99213 OFFICE O/P EST LOW 20 MIN: CPT | Performed by: NURSE PRACTITIONER

## 2019-01-09 NOTE — PATIENT INSTRUCTIONS
Make appointment for Level II U/S  Call with vaginal bleeding, loss of fluid, regular contractions,  other needs or concerns    Return in 4 weeks

## 2019-01-09 NOTE — PROGRESS NOTES
Assessment & Plan  20 y o  [id number] at 16w4d presenting for routine prenatal visit  Declined genetic testing  Provided slip for Level II U/S    Problem List Items Addressed This Visit        Other    16 weeks gestation of pregnancy - Primary    Relevant Orders    Ambulatory Referral to Maternal Fetal Medicine    Prenatal care in second trimester    Relevant Orders    Ambulatory Referral to Maternal Fetal Medicine        ____________________________________________________________  Subjective  She is without complaint  She denies contractions, loss of fluid, or vaginal bleeding  She feels regular fetal movements  Objective  /70   Pulse 91   Wt 60 8 kg (134 lb)   LMP 09/15/2018 (Exact Date)   BMI 25 32 kg/m²   FHR: 150     Patient's Active Problem List  Patient Active Problem List   Diagnosis    Uterine scar from previous  delivery, antepartum condition or complication    Right eye symptoms    Bipolar disorder ([address] )    Myopia of right eye    16 weeks gestation of pregnancy    Prenatal care in second trimester     Plan  Make appointment for Level II U/S  To call with vaginal bleeding, loss of fluid, regular contractions,  other needs or concerns  Return in 4 weeks  Pt verbalized understanding of all discussed

## 2019-01-16 ENCOUNTER — HOSPITAL ENCOUNTER (EMERGENCY)
Facility: HOSPITAL | Age: 24
Discharge: HOME | End: 2019-01-16
Attending: EMERGENCY MEDICINE | Admitting: EMERGENCY MEDICINE
Payer: COMMERCIAL

## 2019-01-16 VITALS
OXYGEN SATURATION: 100 % | WEIGHT: 134.48 LBS | HEART RATE: 69 BPM | TEMPERATURE: 97.2 F | DIASTOLIC BLOOD PRESSURE: 53 MMHG | BODY MASS INDEX: 25.41 KG/M2 | SYSTOLIC BLOOD PRESSURE: 104 MMHG | RESPIRATION RATE: 18 BRPM

## 2019-01-16 DIAGNOSIS — K08.89 TOOTHACHE: Primary | ICD-10-CM

## 2019-01-16 PROCEDURE — 99282 EMERGENCY DEPT VISIT SF MDM: CPT

## 2019-01-16 RX ORDER — PENICILLIN V POTASSIUM 500 MG/1
500 TABLET ORAL 4 TIMES DAILY
Qty: 28 TABLET | Refills: 0 | Status: SHIPPED | OUTPATIENT
Start: 2019-01-16 | End: 2019-01-23

## 2019-01-16 NOTE — ED PROVIDER NOTES
History  Chief Complaint   Patient presents with    Dental Pain     2 weeks of intermittent right lower tooth pain  Reports right ear pain as well  Tylenol with no relief yesterday  No meds PTA  Approx 17 weeks preg       History provided by:  Patient   used: No    Dental Pain   Location:  Lower  Lower teeth location:  31/RL 2nd molar  Quality:  Aching  Severity:  Moderate  Onset quality:  Gradual  Duration: several weeks  Timing:  Constant  Progression:  Worsening  Chronicity:  New  Context: dental caries    Context: not abscess, not dental fracture, not enamel fracture, filling intact and not trauma    Previous work-up:  Filled cavity  Relieved by:  Nothing  Worsened by:  Jaw movement, pressure and touching  Ineffective treatments:  Acetaminophen  Associated symptoms: facial pain    Associated symptoms: no difficulty swallowing, no drooling, no facial swelling, no fever, no gum swelling, no headaches, no neck pain, no neck swelling and no trismus    Associated symptoms comment:  Right jaw pain  Patient is 17 weeks pregnant  Prior to Admission Medications   Prescriptions Last Dose Informant Patient Reported? Taking? Prenatal MV & Min w/FA-DHA (PRENATAL ADULT GUMMY/DHA/FA) 0 4-25 MG CHEW   No No   Sig: Chew 1 tablet daily   Pyridoxine HCl (VITAMIN B-6) 25 MG tablet   No No   Sig: Take 1 tablet (25 mg total) by mouth 3 (three) times a day as needed (nausea)   Patient not taking: Reported on 12/6/2018    doxylamine (UNISON) 25 MG tablet   No No   Sig: Take 1 tablet (25 mg total) by mouth daily at bedtime as needed for sleep or nausea   Patient not taking: Reported on 12/6/2018       Facility-Administered Medications: None       Past Medical History:   Diagnosis Date    Varicella     as child        History reviewed  No pertinent surgical history      Family History   Problem Relation Age of Onset    Diabetes Mother     No Known Problems Father     Diabetes Brother      I have reviewed and agree with the history as documented  Social History   Substance Use Topics    Smoking status: Never Smoker    Smokeless tobacco: Never Used    Alcohol use No        Review of Systems   Constitutional: Negative for chills and fever  HENT: Positive for dental problem and ear pain  Negative for drooling, facial swelling, sore throat and trouble swallowing  Respiratory: Negative for chest tightness  Musculoskeletal: Negative for neck pain  Neurological: Negative for headaches  Physical Exam  Physical Exam   Constitutional: She appears well-developed  No distress  HENT:   Head: Normocephalic and atraumatic  Right Ear: Hearing, tympanic membrane and external ear normal    Left Ear: Hearing and external ear normal    Nose: Nose normal    Mouth/Throat: Oropharynx is clear and moist and mucous membranes are normal  No oropharyngeal exudate  Eyes: Conjunctivae are normal  Right eye exhibits no discharge  Left eye exhibits no discharge  Neck: Normal range of motion  Neck supple  No JVD present  No tracheal deviation present  Cardiovascular: Normal rate  Pulmonary/Chest: Effort normal  No stridor  No respiratory distress  Lymphadenopathy:     She has no cervical adenopathy  Neurological: No cranial nerve deficit  Skin: Skin is warm  No rash noted  She is not diaphoretic  Psychiatric: She has a normal mood and affect  Nursing note and vitals reviewed  No jaw swelling her external tenderness    Change of appear normal     Vital Signs  ED Triage Vitals   Temperature Pulse Respirations Blood Pressure SpO2   01/16/19 0846 01/16/19 0846 01/16/19 0846 01/16/19 0848 01/16/19 0846   (!) 97 2 °F (36 2 °C) 69 18 104/53 100 %      Temp Source Heart Rate Source Patient Position - Orthostatic VS BP Location FiO2 (%)   01/16/19 0846 01/16/19 0846 -- -- --   Oral Monitor         Pain Score       01/16/19 0846       Worst Possible Pain           Vitals:    01/16/19 0846 01/16/19 0848   BP: 104/53   Pulse: 69        Visual Acuity      ED Medications  Medications - No data to display    Diagnostic Studies  Results Reviewed     None                 No orders to display              Procedures  Procedures       Phone Contacts  ED Phone Contact    ED Course                               MDM  CritCare Time    Disposition  Final diagnoses:   Toothache     Time reflects when diagnosis was documented in both MDM as applicable and the Disposition within this note     Time User Action Codes Description Comment    1/16/2019  9:24 AM Libia Reyes [[id number] [id number]] Toothache       ED Disposition     ED Disposition Condition Comment    Discharge  Annell Laser discharge to home/self care  Condition at discharge: Good        Follow-up Information     Follow up With Specialties Details Why Liliane  Schedule an appointment as soon as possible for a visit in 1 week  [address]  [facility] [address]  [phone]          Discharge Medication List as of 1/16/2019  9:25 AM      START taking these medications    Details   penicillin V potassium (VEETID) 500 mg tablet Take 1 tablet (500 mg total) by mouth 4 (four) times a day for 7 days, Starting Wed 1/16/2019, Until Wed 1/23/2019, Print         CONTINUE these medications which have NOT CHANGED    Details   doxylamine (UNISON) 25 MG tablet Take 1 tablet (25 mg total) by mouth daily at bedtime as needed for sleep or nausea, Starting Tue 11/13/2018, Normal      Prenatal MV & Min w/FA-DHA (PRENATAL ADULT GUMMY/DHA/FA) 0 4-25 MG CHEW Chew 1 tablet daily, Starting Tue 11/13/2018, Normal      Pyridoxine HCl (VITAMIN B-6) 25 MG tablet Take 1 tablet (25 mg total) by mouth 3 (three) times a day as needed (nausea), Starting Tue 11/13/2018, Normal           No discharge procedures on file      ED Provider  Electronically Signed by           Heather Pak MD  01/16/19 8754

## 2019-01-16 NOTE — DISCHARGE INSTRUCTIONS
Dolor de [employer]   LO QUE NECESITA SABER:   Un dolor de SMITH'S GREEN que es causado por anival inflamación del nervio en el centro del diente  La irritación puede ser causada por varios problemas, wilfredo por caries, anival infección, un diente vencido o enfermedad de las encías  Es muy importante que acuda a anival elizabeth de control con aparicio odontólogo para que le puedan diagnosticar la causa de aparicio dolor de SMITH'S GREEN y recibir tratamiento  Lo cual puede ayudar a prevenir problemas serios  Steven Arenas EL ADAM HOSPITALARIA:   Medicamentos:  Es posible que usted necesite alguno de los siguientes:  · AINEs (Analgésicos antiinflamatorios no esteroides)  disminuyen la inflamación y el dolor  Rosa medicamento se puede comprar con o sin receta médica  Rosa medicamento puede causar sangrado estomacal o problemas en los riñones en ciertas personas  Si usted paul un medicamento anticoagulante, asegúrese de preguntarle a aparicio médico si los VESTA son seguros para usted  Jennifer siempre la etiqueta y siga cuidadosamente las instrucciones antes de usar rosa medicamento  · El acetaminofén  [employer]  Está disponible sin receta médica  Pregunte la cantidad y la frecuencia con que debe tomarlos  [address]  El acetaminofén puede causar daño en el [place] cuando no se paul de forma correcta  · Los analgésicos  aparicio forma de presentación puede ser en píldora o wilfredo un medicamento que se aplica directamente en el diente o las encías  No espere hasta que el dolor sea severo antes de evelin rosa medicamento  · Antibióticos  contribuyen a combatir o evitar que el arabella contraiga anival infección causada por anival bacteria  Tómelos dennis [employer]  · Carlton milind medicamentos wilfredo se le haya indicado  Consulte con aparicio médico si usted bernarda que aparicio medicamento no le está ayudando o si presenta efectos secundarios  Infórmele si es alérgico a algún medicamento   Mantenga anival lista actualizada de los [place], las vitaminas y los productos herbales que paul  Incluya los siguientes datos de los medicamentos: cantidad, frecuencia y motivo de administración  Traiga con usted la lista o los envases de la píldoras a milind citas de seguimiento  Lleve la lista de los medicamentos con usted en elif de anival emergencia  Programe anival elizabeth de seguimiento con flower dentista dennis wilfredo se le indica:  Es posible que lo refieran a un odontólogo cirujano  Anote milind preguntas para que se acuerde de hacerlas vasquez milidn visitas  Cuidados personales:   · Enjuague la boca con agua tibia con sal 4 veces al día o según las indicaciones  · Es posible que necesite comer alimentos blandos para aliviar el dolor que le causa masticar  Comuníquese con flower dentista si:   · Usted tiene preguntas o inquietudes acerca de flower condición o cuidado  Regrese a la darren de emergencias si:   · Usted tiene dificultad para respirar  · Usted tiene flower jamia o ashley inflamados  · Usted tiene fiebre o escalofríos  · Usted tiene dificultad para hablar o tragar  · Usted tiene dificultad para abrir o cerrar la boca  © 2017 [address] Information is for End User's use only and may not be sold, redistributed or otherwise used for commercial purposes  All illustrations and images included in CareNotes® are the copyrighted property of A D A M , Inc  or Babar Harrison  Esta información es sólo para uso en educación  Flower intención no es darle un consejo médico sobre enfermedades o tratamientos  Colsulte con flower Luh Shouts farmacéutico antes de seguir cualquier régimen médico para saber si es seguro y efectivo para usted

## 2021-08-02 ENCOUNTER — OFFICE VISIT (OUTPATIENT)
Dept: FAMILY MEDICINE CLINIC | Facility: CLINIC | Age: 26
End: 2021-08-02

## 2021-08-02 ENCOUNTER — TELEPHONE (OUTPATIENT)
Dept: FAMILY MEDICINE CLINIC | Facility: CLINIC | Age: 26
End: 2021-08-02

## 2021-08-02 DIAGNOSIS — B34.9 VIRAL ILLNESS: Primary | ICD-10-CM

## 2021-08-02 PROCEDURE — 99442 PR PHYS/QHP TELEPHONE EVALUATION 11-20 MIN: CPT | Performed by: INTERNAL MEDICINE

## 2021-08-02 NOTE — ASSESSMENT & PLAN NOTE
-onset of symptoms 3 days ago: headaches  -Today morning had symptoms of stomach pain at work and vomiting, due to which patient was sent home   -No diarrhea, No fever, no body aches  -No other symptoms reported  -She has been able to eat soups, liquids  -has used aleve for headaches  -Feels symptoms of belly aches- in middle of st[place]; no prior similar symptoms before   -she has been able to eat some soups  -states that needs medical evaluation prior to return to work  -No one other sick jeffrey miller  -works at [employer] works- No other sicks at work that she knows of  -Had Mariah -- last year, 5/2020; had fever at that time   -Declines covid test at this time   -Needs letter for work, provided in chart  -if symptoms still persists after 2 days, notify back and consider testing for COVID, patient verbalized understanding

## 2021-08-02 NOTE — LETTER
August 2, 2021     Patient: Tello Little   YOB: 1995   Date of Visit: 8/2/2021       To Whom it May Concern:    Briana Bertrand is under my professional care  She was seen in my office on 8/2/2021  She may return to work on 08/04/2021  If you have any questions or concerns, please don't hesitate to call           Sincerely,          Maddie Rivera MD        CC: No Recipients

## 2021-08-02 NOTE — PROGRESS NOTES
Virtual Brief Visit    Verification of patient location: [place], [place]    Patient is located in the following state in which I hold an active license [place]      Assessment/Plan:    Problem List Items Addressed This Visit        Other    Viral illness - Primary     -onset of symptoms 3 days ago: headaches  -Today morning had symptoms of stomach pain at work and vomiting, due to which patient was sent home   -No diarrhea, No fever, no body aches  -No other symptoms reported  -She has been able to eat soups, liquids  -has used aleve for headaches  -Feels symptoms of belly aches- in middle of [place]; no prior similar symptoms before   -she has been able to eat some soups  -states that needs medical evaluation prior to return to work  -No one other sick a paul  -works at [employer] works- No other sicks at work that she knows of  -Had Mariah -- last year, 5/2020; had fever at that time   -Declines covid test at this time   -Needs letter for work, provided in chart  -if symptoms still persists after 2 days, notify back and consider testing for COVID, patient verbalized understanding                      Reason for visit is   Chief Complaint   Patient presents with    Virtual Brief Visit        Encounter provider Glory Yen MD    Provider located at [address] [address] [phone]    Recent Visits  No visits were found meeting these conditions  Showing recent visits within past 7 days and meeting all other requirements  Today's Visits  Date Type Provider Dept   08/02/21 Telephone MD Dayanara Hoffmann [facility]   08/02/21 Office Visit MD Dayanara Hoffmann [facility]   Showing today's visits and meeting all other requirements  Future Appointments  No visits were found meeting these conditions    Showing future appointments within next 150 days and meeting all other requirements       After connecting through telephone, the patient was identified by name and date of birth  Stefanie rEwin was informed that this is a telemedicine visit and that the visit is being conducted through telephone  My office door was closed  No one else was in the room  She acknowledged consent and understanding of privacy and security of the platform  The patient has agreed to participate and understands she can discontinue the visit at any time  Patient is aware this is a billable service  It was my intent to perform this visit via video technology but the patient was not able to do a video connection so the visit was completed via audio telephone only  Nuzhat Zhong is a 30 y o  female  Called via televisit for evaluation after symptoms of abdominal discomfort, associated with episode of vomiting today at work  Patient was sent home from work and indicated to have medical evaluation prior to be cleared to return to work  Past Medical History:   Diagnosis Date    Varicella     as child        No past surgical history on file  Current Outpatient Medications   Medication Sig Dispense Refill    doxylamine (UNISON) 25 MG tablet Take 1 tablet (25 mg total) by mouth daily at bedtime as needed for sleep or nausea (Patient not taking: Reported on 12/6/2018 ) 30 tablet 1    Prenatal MV & Min w/FA-DHA (PRENATAL ADULT GUMMY/DHA/FA) 0 4-25 MG CHEW Chew 1 tablet daily 30 tablet 4    Pyridoxine HCl (VITAMIN B-6) 25 MG tablet Take 1 tablet (25 mg total) by mouth 3 (three) times a day as needed (nausea) (Patient not taking: Reported on 12/6/2018 ) 30 tablet 3     No current facility-administered medications for this visit  Allergies   Allergen Reactions    Latex Rash    Iodinated Diagnostic Agents Hives    Percocet [Oxycodone-Acetaminophen] Rash       Review of Systems   Constitutional: Negative for fever  Respiratory: Negative for cough, shortness of breath and wheezing      Cardiovascular: Negative for chest pain, palpitations and leg swelling  Gastrointestinal: Positive for abdominal pain, nausea and vomiting  Neurological: Positive for headaches  Psychiatric/Behavioral: The patient is not nervous/anxious  There were no vitals filed for this visit  I spent 15 minutes directly with the patient during this visit    VIRTUAL VISIT [address] verbally agrees to participate in [employer]  Pt is aware that [employer] could be limited without vital signs or the ability to perform a full hands-on physical exam  Ame Villa understands she or the provider may request at any time to terminate the video visit and request the patient to seek care or treatment in person

## 2021-09-14 ENCOUNTER — TELEPHONE (OUTPATIENT)
Dept: FAMILY MEDICINE CLINIC | Facility: CLINIC | Age: 26
End: 2021-09-14

## 2021-09-14 NOTE — TELEPHONE ENCOUNTER
She doesn't currently have a PCP to write a letter under  Looks like she was seen for a same day visit virtual in August but never scheduled her NP visit so I am unable to provide a letter at this time   Please schedule a virtual visit

## 2021-09-14 NOTE — TELEPHONE ENCOUNTER
Pt would like to know if she can have an excuse for her to her job since her two daughters were sent to get covid tested and is currently waiting for results  Please advice  If any questions please contact pt

## 2022-02-17 ENCOUNTER — TELEPHONE (OUTPATIENT)
Dept: FAMILY MEDICINE CLINIC | Facility: CLINIC | Age: 27
End: 2022-02-17

## 2022-02-18 ENCOUNTER — OFFICE VISIT (OUTPATIENT)
Dept: FAMILY MEDICINE CLINIC | Facility: CLINIC | Age: 27
End: 2022-02-18

## 2022-02-18 VITALS
RESPIRATION RATE: 18 BRPM | HEIGHT: 62 IN | DIASTOLIC BLOOD PRESSURE: 60 MMHG | HEART RATE: 81 BPM | SYSTOLIC BLOOD PRESSURE: 114 MMHG | OXYGEN SATURATION: 99 % | WEIGHT: 132.9 LBS | TEMPERATURE: 97.6 F | BODY MASS INDEX: 24.46 KG/M2

## 2022-02-18 DIAGNOSIS — Z12.4 SCREENING FOR CERVICAL CANCER: ICD-10-CM

## 2022-02-18 DIAGNOSIS — Z11.3 ROUTINE SCREENING FOR STI (SEXUALLY TRANSMITTED INFECTION): ICD-10-CM

## 2022-02-18 DIAGNOSIS — G56.21 CUBITAL TUNNEL SYNDROME ON RIGHT: ICD-10-CM

## 2022-02-18 DIAGNOSIS — Z00.00 ENCOUNTER FOR MEDICAL EXAMINATION TO ESTABLISH CARE: Primary | ICD-10-CM

## 2022-02-18 DIAGNOSIS — M65.4 TENOSYNOVITIS, DE QUERVAIN: ICD-10-CM

## 2022-02-18 DIAGNOSIS — Z86.32 H/O GESTATIONAL DIABETES MELLITUS, NOT CURRENTLY PREGNANT: ICD-10-CM

## 2022-02-18 DIAGNOSIS — Z11.59 NEED FOR HEPATITIS C SCREENING TEST: ICD-10-CM

## 2022-02-18 DIAGNOSIS — Z13.0 SCREENING FOR DEFICIENCY ANEMIA: ICD-10-CM

## 2022-02-18 DIAGNOSIS — M79.641 HAND PAIN, RIGHT: ICD-10-CM

## 2022-02-18 PROBLEM — B34.9 VIRAL ILLNESS: Status: RESOLVED | Noted: 2021-08-02 | Resolved: 2022-02-18

## 2022-02-18 PROBLEM — Z3A.16 16 WEEKS GESTATION OF PREGNANCY: Status: RESOLVED | Noted: 2018-11-13 | Resolved: 2022-02-18

## 2022-02-18 PROBLEM — Z34.92 PRENATAL CARE IN SECOND TRIMESTER: Status: RESOLVED | Noted: 2019-01-09 | Resolved: 2022-02-18

## 2022-02-18 PROCEDURE — 99204 OFFICE O/P NEW MOD 45 MIN: CPT

## 2022-02-18 RX ORDER — NAPROXEN 500 MG/1
500 TABLET ORAL 3 TIMES DAILY PRN
Qty: 90 TABLET | Refills: 0 | Status: SHIPPED | OUTPATIENT
Start: 2022-02-18

## 2022-02-18 NOTE — ASSESSMENT & PLAN NOTE
Reviewed care gaps with pt & importance of completing all tasks for complete healthcare maintenance       Declined flu vaccine  Up to date on tdap

## 2022-02-18 NOTE — ASSESSMENT & PLAN NOTE
-Reports compression to ulnar nerve  -Reports numbness, tingling of 4th finger  -Night splint, elbow pad during the day  -Rest, PRN NSAIDs  -If conservative tx fails, will discuss EMG

## 2022-02-18 NOTE — ASSESSMENT & PLAN NOTE
-Conservative measures are indicated  -Utilize thumb spica splint & activity rest  -NSAIDs and ice massage  -Encouraged OT, pt agrees  -RTC if symptoms worsen or fail to improve  -If above is ineffective, will consider steroid injection

## 2022-02-18 NOTE — PROGRESS NOTES
Assessment/Plan:    Encounter for medical examination to establish care  Reviewed care gaps with pt & importance of completing all tasks for complete healthcare maintenance  Declined flu vaccine  Up to date on tdap    Hand pain, right        Tenosynovitis, de Quervain  -Conservative measures are indicated  -Utilize thumb spica splint & activity rest  -NSAIDs and ice massage  -Encouraged OT, pt agrees  -RTC if symptoms worsen or fail to improve  -If above is ineffective, will consider steroid injection  Cubital tunnel syndrome on right  -Reports compression to ulnar nerve  -Reports numbness, tingling of 4th finger  -Night splint, elbow pad during the day  -Rest, PRN NSAIDs  -If conservative tx fails, will discuss EMG  Diagnoses and all orders for this visit:    Encounter for medical examination to establish care    Need for hepatitis C screening test  -     Hepatitis C Antibody ([facility]); Future    Screening for cervical cancer  -     Ambulatory referral to Obstetrics / Gynecology; Future    Routine screening for STI (sexually transmitted infection)  -     RPR; Future    Screening for deficiency anemia  -     CBC and differential; Future    H/O gestational diabetes mellitus, not currently pregnant  -     Comprehensive metabolic panel; Future  -     HEMOGLOBIN A1C W/ EAG ESTIMATION; Future    Hand pain, right  -     Ambulatory Referral to Occupational Therapy; Future    Cubital tunnel syndrome on right  -     naproxen (Naprosyn) 500 mg tablet; Take 1 tablet (500 mg total) by mouth 3 (three) times a day as needed for moderate pain  -     Ambulatory Referral to Occupational Therapy; Future    Tenosynovitis, de Quervain          Subjective: 2   Patient ID: Luisa Matthew is a 27 y o  female  Luisa Matthew is a 27 y o  female  has a past medical history of gestational diabetes, Varicella  PSH of   Pt presents today to establish care           The following portions of the patient's history were reviewed and updated as appropriate: allergies, current medications, past family history, past medical history, past social history, past surgical history and problem list     Review of Systems   Constitutional: Negative for chills and fever  HENT: Negative for ear pain and sore throat  Eyes: Negative for pain and visual disturbance  Respiratory: Negative for cough and shortness of breath  Cardiovascular: Negative for chest pain and palpitations  Gastrointestinal: Negative for abdominal pain and vomiting  Genitourinary: Negative for dysuria and hematuria  Musculoskeletal: Positive for arthralgias and myalgias  Negative for back pain  Skin: Negative for color change and rash  Neurological: Positive for numbness  Negative for seizures and syncope  All other systems reviewed and are negative  Objective:      /60 (BP Location: Right arm, Patient Position: Sitting, Cuff Size: Standard)   Pulse 81   Temp 97 6 °F (36 4 °C) (Temporal)   Resp 18   Ht 5' 2" (1 575 m)   Wt 60 3 kg (132 lb 14 4 oz)   LMP 02/05/2022 (Exact Date)   SpO2 99%   Breastfeeding No   BMI 24 31 kg/m²          Physical Exam  Vitals and nursing note reviewed  HENT:      Head: Normocephalic and atraumatic  Right Ear: External ear normal       Left Ear: External ear normal       Nose: Nose normal    Eyes:      Extraocular Movements: Extraocular movements intact  Conjunctiva/sclera: Conjunctivae normal       Pupils: Pupils are equal, round, and reactive to light  Cardiovascular:      Rate and Rhythm: Normal rate and regular rhythm  Pulses: Normal pulses  Heart sounds: Normal heart sounds  Pulmonary:      Effort: Pulmonary effort is normal       Breath sounds: Normal breath sounds  Abdominal:      General: Bowel sounds are normal       Palpations: Abdomen is soft  Tenderness: There is no abdominal tenderness  Musculoskeletal:         General: Normal range of motion  Cervical back: Normal range of motion  Comments: (-) tinels, (-) phalens, (+) finkelstein (-) grind test       Skin:     General: Skin is warm and dry  Neurological:      General: No focal deficit present  Mental Status: She is alert and oriented to person, place, and time  Mental status is at baseline  Psychiatric:         Mood and Affect: Mood normal          Behavior: Behavior normal          Thought Content:  Thought content normal

## 2022-02-18 NOTE — PATIENT INSTRUCTIONS
Síndrome del túnel cubital   CUIDADO AMBULATORIO:   El síndrome del túnel cubital es anival afección que provoca la acumulación de presión en el nervio cubital del codo  El nervio cubital controla los músculos y la sensación en la mano  El síndrome del túnel cubital puede ser el resultado de presión [de-identified], estiramiento o anival disminución del flujo sanguíneo al nervio cubital   Los signos y síntomas más comunes incluyen los siguientes:  · Adormecimiento y hormigueo en el brazo o en la mano, por lo general en el dedo anular y el meñique    · Dolor en el codo que se extiende al antebrazo y la mano    · Debilidad en la mano y los dedos    · No poder estirar los dedos, por lo general el anular y el meñique    Busque atención médica de inmediato si:  · Pierde la sensación en la mano o los dedos de forma repentina  · No puede  el dedo anular o meñique  Llame a aparicio médico u ortopedista si:  · Yaneth síntomas Kaylen Passe  · Aparicio mano y yaneth dedos se debilitan tanto que no puede agarrar, apretar o [employer]  · Usted tiene preguntas o inquietudes acerca de aparicio condición o cuidado  El tratamiento podría no ser necesario  Si los síntomas continúan, es posible que necesite alguna de las siguientes opciones:  · [place], pueden disminuir la inflamación y el dolor o la fiebre  Rosa medicamento está disponible con o sin anival receta médica  Los VESTA pueden causar sangrado estomacal o problemas renales en ciertas personas  Si usted paul un medicamento anticoagulante, siempre pregúntele a aparicio médico si los VESTA son seguros para usted  Siempre dewayne la etiqueta de rosa medicamento y [place] instrucciones  · Anival inyección de esteroides ayuda a calmar el dolor y bajar la inflamación  · La cirugía puede ser necesaria si yaneth síntomas no mejoran en un plazo de 3 meses  La cirugía quitará la presión del nervio cubital  Aparicio cirujano podría  el nervio a otro lugar para que deje de estar estirado o comprimido   Mariam Memory parte del hueso si está presionando el nervio  El manejo de milind síntomas:  · No ponga presión en el codo  Ciertas posiciones ponen presión en el nervio cubital del codo  No se apoye sobre el codo  No duerma con el brazo por encima de la blas y el codo doblado  Duerma con el brazo recto, al lado del cuerpo  · Aplique hielo  El hielo ayuda a disminuir la inflamación y el dolor y previene el daño al tejido  Use anival compresa de hielo o ponga hielo triturado en anival bolsa de plástico  Madonna Mary Ellen la bolsa con anival toalla antes de aplicarla en la piel  Aplique hielo vasquez 15 a 20 minutos por hora o según indicaciones  · Descanse el brazo  Evite las actividades que causen los síntomas para permitir que el nervio sane  · Vaya a terapia física según indicaciones  Un fisioterapeuta puede enseñarle ejercicios para fortalecer y [employer]  La fisioterapia también puede ayudar a disminuir el dolor o pérdida de función  · Use anival férula o un soporte para el codo  La férula o el soporte ayudan a disminuir el movimiento del Ranelle Dragoon y a quitar la presión del nervio cubital  Aparicio médico le indicará cuánto tiempo y cómo usarlo cada día  Es posible que deba usarlo a la noche  Puede que también necesite coderas para proteger el codo  Acuda a milind consultas de control con aparicio médico u ortopedista según le indicaron: Anote milind preguntas para que se acuerde de hacerlas vasquez milind visitas  © Copyright [employer] 2021 Information is for End User's use only and may not be sold, redistributed or otherwise used for commercial purposes  All illustrations and images included in CareNotes® are the copyrighted property of A D A M , Inc  or [address] [place] [address] es sólo para uso en educación  Aparicio intención no es darle un consejo médico sobre enfermedades o tratamientos  Colsulte con aparicio Agustín Harada farmacéutico antes de seguir cualquier régimen médico para saber si es seguro y efectivo para usted

## 2022-08-18 ENCOUNTER — TELEPHONE (OUTPATIENT)
Dept: FAMILY MEDICINE CLINIC | Facility: CLINIC | Age: 27
End: 2022-08-18

## 2022-08-18 NOTE — TELEPHONE ENCOUNTER
called not a working number, need to reschedule appointment  couldn't leave a message- appt needed to be cx    Called 2nd number in chart also not a working number

## 2023-05-20 ENCOUNTER — ANNUAL EXAM (OUTPATIENT)
Dept: OBGYN CLINIC | Facility: CLINIC | Age: 28
End: 2023-05-20

## 2023-05-20 VITALS
HEART RATE: 73 BPM | BODY MASS INDEX: 31.53 KG/M2 | SYSTOLIC BLOOD PRESSURE: 119 MMHG | HEIGHT: 60 IN | WEIGHT: 160.6 LBS | DIASTOLIC BLOOD PRESSURE: 68 MMHG

## 2023-05-20 DIAGNOSIS — Z01.419 ENCOUNTER FOR ANNUAL ROUTINE GYNECOLOGICAL EXAMINATION: Primary | ICD-10-CM

## 2023-05-20 NOTE — PROGRESS NOTES
Annual Exam    Assessment   1  Encounter for annual routine gynecological examination  Liquid-based pap, screening        well woman       Plan       All questions answered  Breast self exam technique reviewed and patient encouraged to perform self-exam monthly  Contraception: tubal ligation  Discussed healthy lifestyle modifications  Follow up in 1 year  Thin prep Pap smear  Patient Instructions   PAP results can take up to weeks  Call with needs or concerns  Return in 1 year  Pt verbalized understanding of all discussed  Ab Miranda is a 32 y o   female who presents for annual well woman exam  Periods are regular every 28-30 days, lasting 5 days  No intermenstrual bleeding, spotting, or discharge  Last WNL PAP 2017  1 partner x 1 year, present for exam    Depression Screening Follow-up Plan: Patient's depression screening was negative with a PHQ-2 score of 4  Their PHQ-9 score was 8  Clinically patient does not have depression  No treatment is required  BMI Counseling: Body mass index is 31 37 kg/m²  The BMI is above normal  Nutrition recommendations include reducing portion sizes, decreasing overall calorie intake, 3-5 servings of fruits/vegetables daily, moderation in carbohydrate intake and increasing intake of lean protein  Exercise recommendations include moderate aerobic physical activity for 150 minutes/week  Current contraception: tubal ligation  History of abnormal Pap smear: no  Family history of uterine or ovarian cancer: no  Regular self breast exam: yes  History of abnormal mammogram: N/A  Family history of breast cancer: no  History of abnormal lipids: no  Menstrual History:  OB History        4    Para   3    Term   3            AB        Living   3       SAB        IAB        Ectopic        Multiple        Live Births   1                Menarche age: 4  Patient's last menstrual period was 2023 (approximate)         The following portions of the patient's history were reviewed and updated as appropriate: allergies, current medications, past family history, past medical history, past social history, past surgical history and problem list     Review of Systems  Pertinent items are noted in HPI        Objective      /68   Pulse 73   Ht 5' (1 524 m)   Wt 72 8 kg (160 lb 9 6 oz)   LMP 04/25/2023 (Approximate)   BMI 31 37 kg/m²     General: alert and oriented, in no acute distress, alert, appears stated age and cooperative   Heart: regular rate and rhythm, S1, S2 normal, no murmur, click, rub or gallop   Lungs: clear to auscultation bilaterally, WNL respiratory effort, negative cough or SOB   Thyroid: Negative masses   Abdomen: soft, non-tender, without masses or organomegaly   Vulva: normal   Vagina: normal mucosa   Cervix: no bleeding following Pap, no cervical motion tenderness and no lesions   Uterus: normal size, non-tender, normal shape and consistency   Adnexa: normal adnexa   Urethra: normal   Breasts: NT,negative masses, discharge, or dimpling

## 2023-05-23 LAB
HPV HR 12 DNA CVX QL NAA+PROBE: NEGATIVE
HPV16 DNA CVX QL NAA+PROBE: NEGATIVE
HPV18 DNA CVX QL NAA+PROBE: POSITIVE

## 2023-05-26 LAB
LAB AP GYN PRIMARY INTERPRETATION: NORMAL
Lab: NORMAL
PATH INTERP SPEC-IMP: NORMAL

## 2023-06-12 PROBLEM — R87.810 CERVICAL HIGH RISK HUMAN PAPILLOMAVIRUS (HPV) DNA TEST POSITIVE: Status: ACTIVE | Noted: 2023-06-12

## 2023-06-12 PROBLEM — Z00.00 ENCOUNTER FOR MEDICAL EXAMINATION TO ESTABLISH CARE: Status: RESOLVED | Noted: 2022-02-18 | Resolved: 2023-06-12

## 2023-06-12 NOTE — PROGRESS NOTES
Name: Junito Grullon      : 1995      MRN: [id number]  Encounter Provider: BRAXTON Hayward  Encounter Date: 2023   Encounter department: [address] [address]     1  's permit PE (physical examination)  Assessment & Plan:  Here today for a 's permit physical   Denies any history of seizures, neuro dx,  neuropsych dx, syncope, cardiac issues, htn, drug/ alcohol abuse, diabetes, physical disability and any conditions that cause a lapse of consciousness             2  Obesity (BMI 30-39  9)  Assessment & Plan:  BMI Counseling: Body mass index is 32 03 kg/m²  The BMI is above normal  Nutrition recommendations include reducing portion sizes, decreasing overall calorie intake, 3-5 servings of fruits/vegetables daily, reducing fast food intake, consuming healthier snacks, decreasing soda and/or juice intake, moderation in carbohydrate intake, increasing intake of lean protein, reducing intake of saturated fat and trans fat and reducing intake of cholesterol  Exercise recommendations include moderate aerobic physical activity for 150 minutes/week  Orders:  -     Basic metabolic panel; Future  -     Hemoglobin A1C; Future  -     Lipid Panel with Direct LDL reflex; Future  -     TSH, 3rd generation with Free T4 reflex; Future  -     CBC and differential; Future    3  Routine screening for STI (sexually transmitted infection)  -     Trichomonas Vaginalis, LUIS; Future  -     Chlamydia/GC amplified DNA by PCR; Future  -     RPR-Syphilis Screening (Total Syphilis IGG/IGM); Future  -     HIV 1/2 AG/AB w Reflex [facility] for 2 yr old and above; Future  -     Hepatitis panel, acute; Future         Subjective      Junito Grullon is a 32 y o  female  has a past medical history of Varicella  has a past surgical history that includes  section and Tubal ligation      She presents today for a 's physical    Review of Systems Constitutional: Negative for chills and fever  HENT: Negative for ear pain and sore throat  Eyes: Negative for pain and visual disturbance  Respiratory: Negative for cough and shortness of breath  Cardiovascular: Negative for chest pain and palpitations  Gastrointestinal: Negative for abdominal pain and vomiting  Genitourinary: Negative for dysuria and hematuria  Musculoskeletal: Negative for arthralgias and back pain  Skin: Negative for color change and rash  Neurological: Negative for seizures and syncope  All other systems reviewed and are negative  Current Outpatient Medications on File Prior to Visit   Medication Sig   • naproxen (Naprosyn) 500 mg tablet Take 1 tablet (500 mg total) by mouth 3 (three) times a day as needed for moderate pain (Patient not taking: Reported on 5/20/2023)       Objective     /80 (BP Location: Left arm, Patient Position: Sitting, Cuff Size: Standard)   Pulse 89   Temp 98 7 °F (37 1 °C) (Temporal)   Resp 16   Ht 5' (1 524 m)   Wt 74 4 kg (164 lb)   LMP 05/25/2023 (Approximate)   SpO2 99%   Breastfeeding No   BMI 32 03 kg/m²     Physical Exam  Vitals and nursing note reviewed  Constitutional:       Appearance: She is obese  HENT:      Head: Normocephalic and atraumatic  Right Ear: External ear normal       Left Ear: External ear normal       Nose: Nose normal    Eyes:      Extraocular Movements: Extraocular movements intact  Conjunctiva/sclera: Conjunctivae normal       Pupils: Pupils are equal, round, and reactive to light  Cardiovascular:      Rate and Rhythm: Normal rate and regular rhythm  Pulses: Normal pulses  Heart sounds: Normal heart sounds  Pulmonary:      Effort: Pulmonary effort is normal       Breath sounds: Normal breath sounds  Abdominal:      General: Bowel sounds are normal       Palpations: Abdomen is soft  Tenderness: There is no abdominal tenderness     Musculoskeletal:         General: Normal range of motion  Cervical back: Normal range of motion  Skin:     General: Skin is warm and dry  Neurological:      General: No focal deficit present  Mental Status: She is alert and oriented to person, place, and time  Mental status is at baseline  Psychiatric:         Mood and Affect: Mood normal          Behavior: Behavior normal          Thought Content: Thought content normal          Judgment: Judgment normal      No results found      Marianela Torres

## 2023-06-13 ENCOUNTER — OFFICE VISIT (OUTPATIENT)
Dept: FAMILY MEDICINE CLINIC | Facility: CLINIC | Age: 28
End: 2023-06-13

## 2023-06-13 VITALS
DIASTOLIC BLOOD PRESSURE: 80 MMHG | WEIGHT: 164 LBS | HEIGHT: 60 IN | TEMPERATURE: 98.7 F | BODY MASS INDEX: 32.2 KG/M2 | HEART RATE: 89 BPM | OXYGEN SATURATION: 99 % | SYSTOLIC BLOOD PRESSURE: 110 MMHG | RESPIRATION RATE: 16 BRPM

## 2023-06-13 DIAGNOSIS — Z02.4 DRIVER'S PERMIT PE (PHYSICAL EXAMINATION): Primary | ICD-10-CM

## 2023-06-13 DIAGNOSIS — Z11.3 ROUTINE SCREENING FOR STI (SEXUALLY TRANSMITTED INFECTION): ICD-10-CM

## 2023-06-13 DIAGNOSIS — E66.9 OBESITY (BMI 30-39.9): ICD-10-CM

## 2023-06-13 PROBLEM — M79.641 HAND PAIN, RIGHT: Status: RESOLVED | Noted: 2022-02-18 | Resolved: 2023-06-13

## 2023-06-13 PROBLEM — M65.4 TENOSYNOVITIS, DE QUERVAIN: Status: RESOLVED | Noted: 2022-02-18 | Resolved: 2023-06-13

## 2023-06-13 PROBLEM — G56.21 CUBITAL TUNNEL SYNDROME ON RIGHT: Status: RESOLVED | Noted: 2022-02-18 | Resolved: 2023-06-13

## 2023-06-13 PROCEDURE — 99213 OFFICE O/P EST LOW 20 MIN: CPT

## 2023-06-13 NOTE — ASSESSMENT & PLAN NOTE
Here today for a 's permit physical   Denies any history of seizures, neuro dx,  neuropsych dx, syncope, cardiac issues, htn, drug/ alcohol abuse, diabetes, physical disability and any conditions that cause a lapse of consciousness  Ainsley Mast

## 2023-06-13 NOTE — ASSESSMENT & PLAN NOTE
BMI Counseling: Body mass index is 32 03 kg/m²  The BMI is above normal  Nutrition recommendations include reducing portion sizes, decreasing overall calorie intake, 3-5 servings of fruits/vegetables daily, reducing fast food intake, consuming healthier snacks, decreasing soda and/or juice intake, moderation in carbohydrate intake, increasing intake of lean protein, reducing intake of saturated fat and trans fat and reducing intake of cholesterol  Exercise recommendations include moderate aerobic physical activity for 150 minutes/week

## 2023-06-17 ENCOUNTER — APPOINTMENT (OUTPATIENT)
Dept: LAB | Facility: HOSPITAL | Age: 28
End: 2023-06-17
Payer: MEDICARE

## 2023-06-17 DIAGNOSIS — Z11.3 ROUTINE SCREENING FOR STI (SEXUALLY TRANSMITTED INFECTION): ICD-10-CM

## 2023-06-17 DIAGNOSIS — E66.9 OBESITY (BMI 30-39.9): ICD-10-CM

## 2023-06-17 LAB
ANION GAP SERPL CALCULATED.3IONS-SCNC: 8 MMOL/L (ref 4–13)
BASOPHILS # BLD AUTO: 0.04 THOUSANDS/ÂΜL (ref 0–0.1)
BASOPHILS NFR BLD AUTO: 1 % (ref 0–1)
BUN SERPL-MCNC: 16 MG/DL (ref 5–25)
CALCIUM SERPL-MCNC: 9.2 MG/DL (ref 8.4–10.2)
CHLORIDE SERPL-SCNC: 106 MMOL/L (ref 96–108)
CHOLEST SERPL-MCNC: 170 MG/DL
CO2 SERPL-SCNC: 23 MMOL/L (ref 21–32)
CREAT SERPL-MCNC: 0.62 MG/DL (ref 0.6–1.3)
EOSINOPHIL # BLD AUTO: 0.07 THOUSAND/ÂΜL (ref 0–0.61)
EOSINOPHIL NFR BLD AUTO: 1 % (ref 0–6)
ERYTHROCYTE [DISTWIDTH] IN BLOOD BY AUTOMATED COUNT: 13.2 % (ref 11.6–15.1)
EST. AVERAGE GLUCOSE BLD GHB EST-MCNC: 108 MG/DL
GFR SERPL CREATININE-BSD FRML MDRD: 122 ML/MIN/1.73SQ M
GLUCOSE P FAST SERPL-MCNC: 100 MG/DL (ref 65–99)
HBA1C MFR BLD: 5.4 %
HCT VFR BLD AUTO: 41.1 % (ref 34.8–46.1)
HDLC SERPL-MCNC: 57 MG/DL
HGB BLD-MCNC: 13.5 G/DL (ref 11.5–15.4)
IMM GRANULOCYTES # BLD AUTO: 0.01 THOUSAND/UL (ref 0–0.2)
IMM GRANULOCYTES NFR BLD AUTO: 0 % (ref 0–2)
LDLC SERPL CALC-MCNC: 94 MG/DL (ref 0–100)
LYMPHOCYTES # BLD AUTO: 2.88 THOUSANDS/ÂΜL (ref 0.6–4.47)
LYMPHOCYTES NFR BLD AUTO: 41 % (ref 14–44)
MCH RBC QN AUTO: 28.8 PG (ref 26.8–34.3)
MCHC RBC AUTO-ENTMCNC: 32.8 G/DL (ref 31.4–37.4)
MCV RBC AUTO: 88 FL (ref 82–98)
MONOCYTES # BLD AUTO: 0.66 THOUSAND/ÂΜL (ref 0.17–1.22)
MONOCYTES NFR BLD AUTO: 9 % (ref 4–12)
NEUTROPHILS # BLD AUTO: 3.41 THOUSANDS/ÂΜL (ref 1.85–7.62)
NEUTS SEG NFR BLD AUTO: 48 % (ref 43–75)
NRBC BLD AUTO-RTO: 0 /100 WBCS
PLATELET # BLD AUTO: 349 THOUSANDS/UL (ref 149–390)
PMV BLD AUTO: 9.6 FL (ref 8.9–12.7)
POTASSIUM SERPL-SCNC: 4.1 MMOL/L (ref 3.5–5.3)
RBC # BLD AUTO: 4.69 MILLION/UL (ref 3.81–5.12)
SODIUM SERPL-SCNC: 137 MMOL/L (ref 135–147)
TRIGL SERPL-MCNC: 95 MG/DL
TSH SERPL DL<=0.05 MIU/L-ACNC: 1.83 UIU/ML (ref 0.45–4.5)
WBC # BLD AUTO: 7.07 THOUSAND/UL (ref 4.31–10.16)

## 2023-06-17 PROCEDURE — 83036 HEMOGLOBIN GLYCOSYLATED A1C: CPT

## 2023-06-17 PROCEDURE — 36415 COLL VENOUS BLD VENIPUNCTURE: CPT

## 2023-06-17 PROCEDURE — 87591 N.GONORRHOEAE DNA AMP PROB: CPT

## 2023-06-17 PROCEDURE — 87389 HIV-1 AG W/HIV-1&-2 AB AG IA: CPT

## 2023-06-17 PROCEDURE — 80061 LIPID PANEL: CPT

## 2023-06-17 PROCEDURE — 85025 COMPLETE CBC W/AUTO DIFF WBC: CPT

## 2023-06-17 PROCEDURE — 87661 TRICHOMONAS VAGINALIS AMPLIF: CPT

## 2023-06-17 PROCEDURE — 80048 BASIC METABOLIC PNL TOTAL CA: CPT

## 2023-06-17 PROCEDURE — 87491 CHLMYD TRACH DNA AMP PROBE: CPT

## 2023-06-17 PROCEDURE — 80074 ACUTE HEPATITIS PANEL: CPT

## 2023-06-17 PROCEDURE — 86780 TREPONEMA PALLIDUM: CPT

## 2023-06-17 PROCEDURE — 84443 ASSAY THYROID STIM HORMONE: CPT

## 2023-06-18 LAB
HAV IGM SER QL: NORMAL
HBV CORE IGM SER QL: NORMAL
HBV SURFACE AG SER QL: NORMAL
HCV AB SER QL: NORMAL
HIV 1+2 AB+HIV1 P24 AG SERPL QL IA: NORMAL
HIV 2 AB SERPL QL IA: NORMAL
HIV1 AB SERPL QL IA: NORMAL
HIV1 P24 AG SERPL QL IA: NORMAL
TREPONEMA PALLIDUM IGG+IGM AB [PRESENCE] IN SERUM OR PLASMA BY IMMUNOASSAY: NORMAL

## 2023-06-19 LAB
C TRACH DNA SPEC QL NAA+PROBE: NEGATIVE
N GONORRHOEA DNA SPEC QL NAA+PROBE: NEGATIVE

## 2023-06-21 LAB — T VAGINALIS RRNA SPEC QL NAA+PROBE: NEGATIVE

## 2023-07-10 NOTE — PROGRESS NOTES
Name: Angela Clement      : 1995      MRN: [id number]  Encounter Provider: BRAXTON Hood  Encounter Date: 2023   Encounter department: [address] [facility]     1. Encounter to discuss test results  Assessment & Plan:  - Discussed with patient that lab results were normal.           Subjective      Angela Clement is a 32 y.o. female  has a past medical history of Varicella. has a past surgical history that includes  section and Tubal ligation. She presents today for a follow-up of her labs. Review of Systems   Constitutional: Negative for chills and fever. HENT: Negative for ear pain and sore throat. Eyes: Negative for pain and visual disturbance. Respiratory: Negative for cough and shortness of breath. Cardiovascular: Negative for chest pain and palpitations. Gastrointestinal: Negative for abdominal pain and vomiting. Genitourinary: Negative for dysuria and hematuria. Musculoskeletal: Negative for arthralgias and back pain. Skin: Negative for color change and rash. Neurological: Negative for seizures and syncope. All other systems reviewed and are negative. Current Outpatient Medications on File Prior to Visit   Medication Sig   • naproxen (Naprosyn) 500 mg tablet Take 1 tablet (500 mg total) by mouth 3 (three) times a day as needed for moderate pain (Patient not taking: Reported on 2023)       Objective     /80 (BP Location: Left arm, Patient Position: Sitting, Cuff Size: Standard)   Pulse 92   Temp 98.7 °F (37.1 °C) (Temporal)   Resp 16   Ht 5' (1.524 m)   Wt 73.9 kg (163 lb)   LMP 2023 (Approximate)   SpO2 99%   BMI 31.83 kg/m²     Physical Exam  Vitals and nursing note reviewed. Constitutional:       General: She is not in acute distress. Appearance: She is not ill-appearing, toxic-appearing or diaphoretic. HENT:      Head: Normocephalic and atraumatic. Right Ear: External ear normal.      Left Ear: External ear normal.      Nose: Nose normal.   Eyes:      Conjunctiva/sclera: Conjunctivae normal.   Cardiovascular:      Rate and Rhythm: Normal rate. Pulmonary:      Effort: Pulmonary effort is normal.   Musculoskeletal:         General: Normal range of motion. Cervical back: Normal range of motion and neck supple. Skin:     General: Skin is warm and dry. Neurological:      Mental Status: She is alert and oriented to person, place, and time. Mental status is at baseline. Psychiatric:         Mood and Affect: Mood normal.         Behavior: Behavior normal.         Thought Content:  Thought content normal.         Judgment: Judgment normal.       Andre Castaneda

## 2023-07-13 ENCOUNTER — OFFICE VISIT (OUTPATIENT)
Dept: FAMILY MEDICINE CLINIC | Facility: CLINIC | Age: 28
End: 2023-07-13

## 2023-07-13 VITALS
HEIGHT: 60 IN | HEART RATE: 92 BPM | TEMPERATURE: 98.7 F | WEIGHT: 163 LBS | BODY MASS INDEX: 32 KG/M2 | SYSTOLIC BLOOD PRESSURE: 120 MMHG | OXYGEN SATURATION: 99 % | RESPIRATION RATE: 16 BRPM | DIASTOLIC BLOOD PRESSURE: 80 MMHG

## 2023-07-13 DIAGNOSIS — Z71.2 ENCOUNTER TO DISCUSS TEST RESULTS: Primary | ICD-10-CM

## 2023-07-13 PROCEDURE — 99212 OFFICE O/P EST SF 10 MIN: CPT

## 2023-08-12 PROBLEM — Z02.4 DRIVER'S PERMIT PE (PHYSICAL EXAMINATION): Status: RESOLVED | Noted: 2023-06-13 | Resolved: 2023-08-12

## 2023-08-24 ENCOUNTER — TELEPHONE (OUTPATIENT)
Dept: OBGYN CLINIC | Facility: CLINIC | Age: 28
End: 2023-08-24

## 2024-05-21 ENCOUNTER — TELEPHONE (OUTPATIENT)
Dept: OBGYN CLINIC | Facility: CLINIC | Age: 29
End: 2024-05-21

## 2024-06-18 ENCOUNTER — APPOINTMENT (INPATIENT)
Dept: RADIOLOGY | Facility: HOSPITAL | Age: 29
DRG: 058 | End: 2024-06-18
Payer: MEDICARE

## 2024-06-18 ENCOUNTER — APPOINTMENT (OUTPATIENT)
Dept: RADIOLOGY | Facility: HOSPITAL | Age: 29
DRG: 058 | End: 2024-06-18
Payer: MEDICARE

## 2024-06-18 ENCOUNTER — HOSPITAL ENCOUNTER (INPATIENT)
Facility: HOSPITAL | Age: 29
LOS: 2 days | Discharge: HOME | DRG: 058 | End: 2024-06-20
Attending: EMERGENCY MEDICINE | Admitting: PSYCHIATRY & NEUROLOGY
Payer: MEDICARE

## 2024-06-18 ENCOUNTER — OFFICE VISIT (OUTPATIENT)
Dept: URGENT CARE | Age: 29
End: 2024-06-18
Payer: MEDICARE

## 2024-06-18 VITALS
HEART RATE: 74 BPM | WEIGHT: 163 LBS | TEMPERATURE: 97.8 F | HEIGHT: 60 IN | BODY MASS INDEX: 32 KG/M2 | OXYGEN SATURATION: 98 % | RESPIRATION RATE: 18 BRPM | SYSTOLIC BLOOD PRESSURE: 100 MMHG | DIASTOLIC BLOOD PRESSURE: 64 MMHG

## 2024-06-18 DIAGNOSIS — E34.8 PINEAL GLAND CYST: ICD-10-CM

## 2024-06-18 DIAGNOSIS — R20.0 NUMBNESS AND TINGLING OF RIGHT FACE: Primary | ICD-10-CM

## 2024-06-18 DIAGNOSIS — R20.2 NUMBNESS AND TINGLING OF RIGHT ARM AND LEG: ICD-10-CM

## 2024-06-18 DIAGNOSIS — R20.2 NUMBNESS AND TINGLING OF RIGHT FACE: Primary | ICD-10-CM

## 2024-06-18 DIAGNOSIS — R20.0 NUMBNESS AND TINGLING OF RIGHT ARM AND LEG: ICD-10-CM

## 2024-06-18 DIAGNOSIS — I63.9 CEREBROVASCULAR ACCIDENT (CVA), UNSPECIFIED MECHANISM (HCC): Primary | ICD-10-CM

## 2024-06-18 DIAGNOSIS — R29.90 STROKE-LIKE SYMPTOMS: ICD-10-CM

## 2024-06-18 DIAGNOSIS — R29.898 RIGHT ARM WEAKNESS: ICD-10-CM

## 2024-06-18 LAB
2HR DELTA HS TROPONIN: 0 NG/L
AMPHETAMINES SERPL QL SCN: NEGATIVE
ANION GAP SERPL CALCULATED.3IONS-SCNC: 7 MMOL/L (ref 4–13)
APTT PPP: 28 SECONDS (ref 23–37)
ATRIAL RATE: 84 BPM
BARBITURATES UR QL: NEGATIVE
BENZODIAZ UR QL: NEGATIVE
BUN SERPL-MCNC: 9 MG/DL (ref 5–25)
CALCIUM SERPL-MCNC: 8.9 MG/DL (ref 8.4–10.2)
CARDIAC TROPONIN I PNL SERPL HS: 3 NG/L
CARDIAC TROPONIN I PNL SERPL HS: 3 NG/L
CHLORIDE SERPL-SCNC: 105 MMOL/L (ref 96–108)
CO2 SERPL-SCNC: 25 MMOL/L (ref 21–32)
COCAINE UR QL: NEGATIVE
CREAT SERPL-MCNC: 0.61 MG/DL (ref 0.6–1.3)
ERYTHROCYTE [DISTWIDTH] IN BLOOD BY AUTOMATED COUNT: 13.3 % (ref 11.6–15.1)
FENTANYL UR QL SCN: NEGATIVE
FLUAV RNA RESP QL NAA+PROBE: NEGATIVE
FLUBV RNA RESP QL NAA+PROBE: NEGATIVE
GFR SERPL CREATININE-BSD FRML MDRD: 122 ML/MIN/1.73SQ M
GLUCOSE SERPL-MCNC: 101 MG/DL (ref 65–140)
GLUCOSE SERPL-MCNC: 103 MG/DL (ref 65–140)
GLUCOSE SERPL-MCNC: 95 MG/DL (ref 65–140)
HCG SERPL QL: NEGATIVE
HCT VFR BLD AUTO: 40.1 % (ref 34.8–46.1)
HGB BLD-MCNC: 13.5 G/DL (ref 11.5–15.4)
HYDROCODONE UR QL SCN: NEGATIVE
INR PPP: 1 (ref 0.84–1.19)
MCH RBC QN AUTO: 29.8 PG (ref 26.8–34.3)
MCHC RBC AUTO-ENTMCNC: 33.7 G/DL (ref 31.4–37.4)
MCV RBC AUTO: 89 FL (ref 82–98)
METHADONE UR QL: NEGATIVE
OPIATES UR QL SCN: NEGATIVE
OXYCODONE+OXYMORPHONE UR QL SCN: NEGATIVE
P AXIS: 56 DEGREES
PCP UR QL: NEGATIVE
PLATELET # BLD AUTO: 258 THOUSANDS/UL (ref 149–390)
PMV BLD AUTO: 11.1 FL (ref 8.9–12.7)
POTASSIUM SERPL-SCNC: 4 MMOL/L (ref 3.5–5.3)
PR INTERVAL: 158 MS
PROTHROMBIN TIME: 13.1 SECONDS (ref 11.6–14.5)
QRS AXIS: 78 DEGREES
QRSD INTERVAL: 74 MS
QT INTERVAL: 338 MS
QTC INTERVAL: 399 MS
RBC # BLD AUTO: 4.53 MILLION/UL (ref 3.81–5.12)
RSV RNA RESP QL NAA+PROBE: NEGATIVE
SARS-COV-2 RNA RESP QL NAA+PROBE: NEGATIVE
SODIUM SERPL-SCNC: 137 MMOL/L (ref 135–147)
T WAVE AXIS: 40 DEGREES
THC UR QL: NEGATIVE
VENTRICULAR RATE: 84 BPM
WBC # BLD AUTO: 8.01 THOUSAND/UL (ref 4.31–10.16)

## 2024-06-18 PROCEDURE — 85027 COMPLETE CBC AUTOMATED: CPT | Performed by: EMERGENCY MEDICINE

## 2024-06-18 PROCEDURE — 36415 COLL VENOUS BLD VENIPUNCTURE: CPT | Performed by: EMERGENCY MEDICINE

## 2024-06-18 PROCEDURE — 85730 THROMBOPLASTIN TIME PARTIAL: CPT | Performed by: EMERGENCY MEDICINE

## 2024-06-18 PROCEDURE — 84703 CHORIONIC GONADOTROPIN ASSAY: CPT | Performed by: PSYCHIATRY & NEUROLOGY

## 2024-06-18 PROCEDURE — 0241U HB NFCT DS VIR RESP RNA 4 TRGT: CPT | Performed by: EMERGENCY MEDICINE

## 2024-06-18 PROCEDURE — 82948 REAGENT STRIP/BLOOD GLUCOSE: CPT

## 2024-06-18 PROCEDURE — 80307 DRUG TEST PRSMV CHEM ANLYZR: CPT

## 2024-06-18 PROCEDURE — 96374 THER/PROPH/DIAG INJ IV PUSH: CPT

## 2024-06-18 PROCEDURE — 99223 1ST HOSP IP/OBS HIGH 75: CPT | Performed by: STUDENT IN AN ORGANIZED HEALTH CARE EDUCATION/TRAINING PROGRAM

## 2024-06-18 PROCEDURE — 70498 CT ANGIOGRAPHY NECK: CPT

## 2024-06-18 PROCEDURE — 82948 REAGENT STRIP/BLOOD GLUCOSE: CPT | Performed by: EMERGENCY MEDICINE

## 2024-06-18 PROCEDURE — 80048 BASIC METABOLIC PNL TOTAL CA: CPT | Performed by: EMERGENCY MEDICINE

## 2024-06-18 PROCEDURE — 93010 ELECTROCARDIOGRAM REPORT: CPT | Performed by: INTERNAL MEDICINE

## 2024-06-18 PROCEDURE — 96375 TX/PRO/DX INJ NEW DRUG ADDON: CPT

## 2024-06-18 PROCEDURE — 99285 EMERGENCY DEPT VISIT HI MDM: CPT | Performed by: EMERGENCY MEDICINE

## 2024-06-18 PROCEDURE — 85610 PROTHROMBIN TIME: CPT | Performed by: EMERGENCY MEDICINE

## 2024-06-18 PROCEDURE — 70551 MRI BRAIN STEM W/O DYE: CPT

## 2024-06-18 PROCEDURE — 70496 CT ANGIOGRAPHY HEAD: CPT

## 2024-06-18 PROCEDURE — 84484 ASSAY OF TROPONIN QUANT: CPT | Performed by: EMERGENCY MEDICINE

## 2024-06-18 PROCEDURE — 99285 EMERGENCY DEPT VISIT HI MDM: CPT

## 2024-06-18 PROCEDURE — 93005 ELECTROCARDIOGRAM TRACING: CPT

## 2024-06-18 RX ORDER — DIPHENHYDRAMINE HYDROCHLORIDE 50 MG/ML
50 INJECTION INTRAMUSCULAR; INTRAVENOUS ONCE
Status: COMPLETED | OUTPATIENT
Start: 2024-06-18 | End: 2024-06-18

## 2024-06-18 RX ORDER — ASPIRIN 325 MG
325 TABLET ORAL ONCE
Status: COMPLETED | OUTPATIENT
Start: 2024-06-18 | End: 2024-06-18

## 2024-06-18 RX ORDER — ATORVASTATIN CALCIUM 40 MG/1
40 TABLET, FILM COATED ORAL EVERY EVENING
Status: DISCONTINUED | OUTPATIENT
Start: 2024-06-18 | End: 2024-06-20 | Stop reason: HOSPADM

## 2024-06-18 RX ORDER — DIPHENHYDRAMINE HYDROCHLORIDE 50 MG/ML
INJECTION INTRAMUSCULAR; INTRAVENOUS
Status: COMPLETED
Start: 2024-06-18 | End: 2024-06-18

## 2024-06-18 RX ADMIN — IOHEXOL 80 ML: 350 INJECTION, SOLUTION INTRAVENOUS at 15:26

## 2024-06-18 RX ADMIN — ATORVASTATIN CALCIUM 40 MG: 40 TABLET, FILM COATED ORAL at 18:47

## 2024-06-18 RX ADMIN — HYDROCORTISONE SODIUM SUCCINATE 200 MG: 100 INJECTION, POWDER, FOR SOLUTION INTRAMUSCULAR; INTRAVENOUS at 09:41

## 2024-06-18 RX ADMIN — ASPIRIN 325 MG ORAL TABLET 325 MG: 325 PILL ORAL at 10:30

## 2024-06-18 RX ADMIN — DIPHENHYDRAMINE HYDROCHLORIDE 50 MG: 50 INJECTION, SOLUTION INTRAMUSCULAR; INTRAVENOUS at 13:41

## 2024-06-18 RX ADMIN — DIPHENHYDRAMINE HYDROCHLORIDE 50 MG: 50 INJECTION INTRAMUSCULAR; INTRAVENOUS at 13:41

## 2024-06-18 NOTE — H&P
NEUROLOGY - ADMISSION H&P NOTE     Name: Ame Bonilla   Age & Sex: 28 y.o. female   MRN: [id number]  Unit/Bed#: ED 23   Encounter: [id number]    ASSESSMENT & PLAN     Stroke-like symptoms  Assessment & Plan  28-year-old female with no documented significant past medical history, who presented as a prehospital stroke alert on 6/18 with strokelike symptoms.  Patient reports that she went to bed the night prior with normal and woke up at approximately 4:15 AM on 6/18 with right facial/arm/leg numbness/tingling.  BP on presentation 137/86.  NIHSS 2.  CT head without acute changes noted.  Due to contrast allergy, discussed with attending neurologist and decision was made to defer CTA H/N until patient was unable to undergo appropriate prep. patient was deemed not a TNK candidate due to being outside of the appropriate window/nondisabling symptoms. Unclear etiology at this time. Workup as noted below.    Plan:  - Stroke pathway  CTA head and neck after prep for contrast allergy  MRI brain  Echo  Lipid Panel  Hemoglobin A1c  S/p aspirin 325 mg x 1, start aspirin 81 mg tomorrow AM  Atorvastatin 40 mg  Permissive HTN, labetalol if SBP >200  Continue telemetry  PT/OT/ST  Frequent neuro checks. Continue to monitor and notify neurology with any changes.   - Medical management and supportive care per primary team. Correction of any metabolic or infectious disturbances.       Thrombolytic Decision: Patient not a candidate. Unclear time of onset outside appropriate time window. and Symptoms resolved/clearly non disabling.     Recommendations for outpatient neurological follow up have yet to be determined.     Pending for discharge: Stroke work up    Case and treatment plan reviewed with attending neurologist, Dr. Horn. Please see attending attestation for any further recommendations.    VTE Prophylaxis: sequential compression device   Code Status: Full code    Anticipated Length of Stay:  Patient will be admitted on an  Inpatient basis with an anticipated length of stay of  > 2 midnights.     Justification for Hospital Stay: Stroke workup    CHIEF COMPLAINT     Chief Complaint   Patient presents with    STROKE Alert        HISTORY OF PRESENT ILLNESS     Patient last known well:  9pm  Stroke alert called: Pre-hospital 9:21 am  Neurology time of arrival: 9:25 am  HPI: Ame Bonilla is a 28 y.o. right handed female with no documented significant past medical history, who presents with stroke-like symptoms.     Patient reports she went to bed normal last night around 9 pm. She woke up around 4:15 am and noticed R facial/arm/leg numbness/tingling. She denies any numbness/tingling in her R torso. Her symptoms continue to persist. Pre-hospital stroke alert was initiated. Patient denies any weakness, headaches, neck pain, back pain, vision changes, dizziness/lightheadedness, speech difficulty. She reports she was able to ambulate but her R leg felt numb when she did. She is not on AC/AP at baseline. She does endorse increased personal stressors at home. She has not had these symptoms before.      REVIEW OF SYSTEMS     A 12 system ROS was completed. Other than the above mentioned complaints in the HPI and those commented on below, all remaining systems were negative.    PAST MEDICAL HISTORY     Past Medical History:   Diagnosis Date    Varicella     as child        Allergies:   Allergies   Allergen Reactions    Latex Rash    Iodinated Contrast Media Hives    Percocet [Oxycodone-Acetaminophen] Rash       PAST SURGICAL HISTORY     Past Surgical History:   Procedure Laterality Date     SECTION      TUBAL LIGATION         SOCIAL & FAMILY HISTORY     Social History     Substance and Sexual Activity   Alcohol Use No       Social History     Substance and Sexual Activity   Drug Use No     Social History     Tobacco Use   Smoking Status Never   Smokeless Tobacco Never         Family History:  Family History   Problem Relation Age of  Onset    Diabetes Mother     No Known Problems Father     Diabetes Brother        OBJECTIVE     Vitals:    24 1430 24 1445 24 1500 24 1515   BP: 110/64 112/67 110/64 110/64   Pulse: 92 84 82 96   Resp: 17 15 17 17   Temp:       TempSrc:       SpO2: 97% 96% 97% 98%   Weight:            Temperature:   Temp (24hrs), Av.2 °F (36.8 °C), Min:97.8 °F (36.6 °C), Max:98.6 °F (37 °C)    Temperature: 98.6 °F (37 °C)    Intake & Output:  I/O       None            Weights:        Body mass index is 30.76 kg/m².  Weight (last 2 days)       Date/Time Weight    24 0929 71.5 (157.52)            Physical Exam  Vitals and nursing note reviewed.   Constitutional:       General: She is not in acute distress.     Appearance: She is not ill-appearing or diaphoretic.   HENT:      Head: Normocephalic.      Mouth/Throat:      Mouth: Mucous membranes are moist.      Pharynx: Oropharynx is clear.   Eyes:      General: No scleral icterus.        Right eye: No discharge.         Left eye: No discharge.      Extraocular Movements: Extraocular movements intact and EOM normal.      Conjunctiva/sclera: Conjunctivae normal.   Cardiovascular:      Rate and Rhythm: Normal rate.   Pulmonary:      Effort: Pulmonary effort is normal. No respiratory distress.   Skin:     General: Skin is warm and dry.      Coloration: Skin is not jaundiced or pale.   Neurological:      Mental Status: She is alert and oriented to person, place, and time.      Coordination: Finger-Nose-Finger Test and Heel to Shin Test normal.      Deep Tendon Reflexes:      Reflex Scores:       Bicep reflexes are 2+ on the right side and 2+ on the left side.       Brachioradialis reflexes are 2+ on the right side and 2+ on the left side.       Patellar reflexes are 2+ on the right side and 2+ on the left side.  Psychiatric:         Mood and Affect: Mood normal.          Neurologic Exam     Mental Status   Oriented to person, place, and time.   Level of  consciousness: alert  Able to follow commands. No dysarthria or aphasia noted.     Cranial Nerves     CN II   Right visual field deficit: none  Left visual field deficit: none     CN III, IV, VI   Extraocular motions are normal.     CN VII   Facial expression full, symmetric.     CN VIII   Hearing: intact    CN IX, X   Palate: symmetric    CN XI   CN XI normal.     CN XII   CN XII normal.   Decreased sensation to pinprick in R forehead, cheek, and mandible  Reports tingling to light touch in R cheek/mandible      Motor Exam   Muscle bulk: normal  LUE strength 5/5 deltoid, bicep, triceps, hand   RUE strength 4/5 deltoid, 3/5 bicep, 3/5 triceps, 5/5 hand   LLE strength 5/5 hip flexion, knee flexion, knee extension, dorsiflexion, plantar flexion  RLE strength 4/5 hip flexion, 4-/5 knee flexion, 5/5 knee extension, 5/5 dorsiflexion, 3/5 plantar flexion     Sensory Exam   Right arm vibration: decreased from fingers  Left arm vibration: normal  Right leg vibration: decreased from toes  Left leg vibration: normal  Patchy sensation testing to light touch/pinprick/temperature in RUE/RLE      Gait, Coordination, and Reflexes     Coordination   Finger to nose coordination: normal  Heel to shin coordination: normal    Tremor   Resting tremor: absent  Intention tremor: absent    Reflexes   Right brachioradialis: 2+  Left brachioradialis: 2+  Right biceps: 2+  Left biceps: 2+  Right patellar: 2+  Left patellar: 2+       NIHSS:  1a.Level of Consciousness: 0 = Alert   1b. LOC Questions: 0 = Answers both correctly   1c. LOC Commands: 0 = Obeys both correctly   2. Best Gaze: 0 = Normal   3. Visual: 0 = No visual field loss   4. Facial Palsy: 0=Normal symmetric movement   5a. Motor Right Arm: 1=Drift, limb holds 90 (or 45) degrees but drifts down before full 10 seconds: does not hit bed   5b. Motor Left Arm: 0=No drift, limb holds 90 (or 45) degrees for full 10 seconds   6a. Motor Right Le=No drift, limb holds 90 (or 45)  degrees for full 10 seconds   6b. Motor Left Le=No drift, limb holds 90 (or 45) degrees for full 10 seconds   7. Limb Ataxia:  0=Absent   8. Sensory: 1=Mild to moderate sensory loss; patient feels pinprick is less sharp or is dull on the affected side; there is a loss of superficial pain with pinprick but patient is aware She is being touched   9. Best Language:  0=No aphasia, normal   10. Dysarthria: 0=Normal articulation   11. Extinction and Inattention (formerly Neglect): 0=No abnormality   Total Score: 2     Time NIHSS was completed: 9:36 am     Modified Alexander Score:  1 (No significant disability. Able to carry out all usual activities, despite some symptoms)    LABORATORY DATA     Labs: I have personally reviewed pertinent reports.    Results from last 7 days   Lab Units 24  0937   WBC Thousand/uL 8.01   HEMOGLOBIN g/dL 13.5   HEMATOCRIT % 40.1   PLATELETS Thousands/uL 258      Results from last 7 days   Lab Units 24  0937   SODIUM mmol/L 137   POTASSIUM mmol/L 4.0   CHLORIDE mmol/L 105   CO2 mmol/L 25   BUN mg/dL 9   CREATININE mg/dL 0.61   CALCIUM mg/dL 8.9              Results from last 7 days   Lab Units 24  0937   INR  1.00   PTT seconds 28               Micro:  Lab Results   Component Value Date    URINECX 20,000-29,000 cfu/ml 2018       IMAGING & DIAGNOSTIC TESTING     Radiology Results: I have personally reviewed pertinent reports.   and I have personally reviewed pertinent films in PACS    CTA stroke alert (head/neck)   Final Result by Jennifer Hdez MD ( [id number])      CT Brain:      1.  No acute intracranial abnormality. No significant interval change.   2.  Partially empty sella which can be seen in intracranial hypertension in the right clinical setting.   3.  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 Recommend follow-up with brain MRI without and with contrast to complete characterization.      CT Angiography: Unremarkable CTA neck and brain.                     Findings were directly  discussed with Mikey Shukla  at 3:30 pm                           Workstation performed: [id number]         CT stroke alert brain   Final Result by Jennifer Hdez MD (06/18 0947)      1.  No acute intracranial CT abnormality.   2.  Partially empty sella which can be seen in intracranial hypertension in the right clinical setting.   3.  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 Recommend follow-up brain MRI without and with contrast for complete characterization.                  Findings were directly discussed with Johnny Horn at approximately 9:42 am.      Workstation performed: [id number]         MRI Inpatient Order    (Results Pending)       Other Diagnostic Testing: I have personally reviewed pertinent reports.      ACTIVE MEDICATIONS     Current Facility-Administered Medications   Medication Dose Route Frequency    [START ON 6/19/2024] aspirin (ECOTRIN LOW STRENGTH) EC tablet 81 mg  81 mg Oral Daily    atorvastatin (LIPITOR) tablet 40 mg  40 mg Oral QPM         HOME MEDICATIONS     Prior to Admission medications    Medication Sig Start Date End Date Taking? Authorizing Provider   naproxen (Naprosyn) 500 mg tablet Take 1 tablet (500 mg total) by mouth 3 (three) times a day as needed for moderate pain  Patient not taking: Reported on 5/20/2023 2/18/22   BRAXTON Yousif     ACTIVE MEDICATIONS

## 2024-06-18 NOTE — PROGRESS NOTES
[facility]        NAME: Ame Bonilla is a 28 y.o. female  : 1995    MRN: [id number]  DATE: 2024  TIME: 9:15 AM    Assessment and Plan   Numbness and tingling of right face [R20.0, R20.2]  1. Numbness and tingling of right face  Transfer to other facility      2. Numbness and tingling of right arm and leg  Transfer to other facility      3. Right arm weakness  Transfer to other facility        Baseline NIH stroke scale of 3 noted at approximately 8:45 AM.  Blood glucose noted to be 101 mg/dL.  Onset of symptoms was approximately 4:16 AM today.  Patient referred to the ED, transported via EMS.  At time of departure, she was otherwise hemodynamically stable.  She is not anticoagulated, and denies recent fall to the ground, head strike, neck trauma or chiropractic manipulation.    Patient Instructions       Follow up with PCP in 3-5 days.  Proceed to  ER if symptoms worsen.    If tests have been performed at [facility], our office will contact you with results if changes need to be made to the care plan discussed with you at the visit.  [facility]    Chief Complaint     Chief Complaint   Patient presents with    Fatigue     Patient states she woke up at 0416 hours this morning with right sided numbness from right side of face down to toes.  Patient denies headaches, shortness of breath, chest pain or dysphagia. Patient denies any injury or falls of recent history.         History of Present Illness       28-year-old female with history of bipolar depression, right eye myopia presents with chief complaint of sudden onset right-sided facial, arm and leg numbness which began approximately 4:16 AM this morning.  Patient states symptoms have been sudden since onset.  She denies current anticoagulation use, recent fall to the ground, head strike, neck or back pain.  Additionally denies any bowel bladder symptoms or leg weakness.  Denies headaches  visual changes speech changes, chest pain or shortness of breath.  Denies left-sided symptoms.  States that she has experienced the symptoms transiently in the face before however never any arm or leg.  She denies a history of hypertension, hyperlipidemia diabetes or cardiovascular disease or CVA in the past.    Other  This is a new problem. The current episode started today. Associated symptoms include numbness and weakness. Pertinent negatives include no abdominal pain, anorexia, arthralgias, change in bowel habit, chest pain, chills, congestion, coughing, diaphoresis, fatigue, fever, headaches, joint swelling, myalgias, nausea, neck pain, rash, sore throat, swollen glands, urinary symptoms, vertigo, visual change or vomiting.       Review of Systems   Review of Systems   Constitutional:  Negative for activity change, appetite change, chills, diaphoresis, fatigue and fever.   HENT:  Negative for congestion, dental problem, drooling, ear discharge, ear pain, facial swelling, hearing loss, mouth sores, nosebleeds, postnasal drip, rhinorrhea, sinus pressure, sinus pain, sneezing, sore throat, tinnitus, trouble swallowing and voice change.    Eyes:  Negative for pain and visual disturbance.   Respiratory:  Negative for cough and shortness of breath.    Cardiovascular:  Negative for chest pain and palpitations.   Gastrointestinal:  Negative for abdominal distention, abdominal pain, anal bleeding, anorexia, blood in stool, change in bowel habit, constipation, diarrhea, nausea, rectal pain and vomiting.   Genitourinary:  Negative for dysuria and hematuria.   Musculoskeletal:  Negative for arthralgias, back pain, joint swelling, myalgias and neck pain.   Skin:  Negative for color change and rash.   Neurological:  Positive for weakness and numbness. Negative for dizziness, vertigo, tremors, seizures, syncope, facial asymmetry, speech difficulty, light-headedness and headaches.   All other systems reviewed and are  negative.        Current Medications       Current Outpatient Medications:     naproxen (Naprosyn) 500 mg tablet, Take 1 tablet (500 mg total) by mouth 3 (three) times a day as needed for moderate pain (Patient not taking: Reported on 2023), Disp: 90 tablet, Rfl: 0    Current Allergies     Allergies as of 2024 - Reviewed 2024   Allergen Reaction Noted    Latex Rash 2018    Iodinated contrast media Hives 2016    Percocet [oxycodone-acetaminophen] Rash 2016            The following portions of the patient's history were reviewed and updated as appropriate: allergies, current medications, past family history, past medical history, past social history, past surgical history and problem list.     Past Medical History:   Diagnosis Date    Varicella     as child        Past Surgical History:   Procedure Laterality Date     SECTION      TUBAL LIGATION         Family History   Problem Relation Age of Onset    Diabetes Mother     No Known Problems Father     Diabetes Brother          Medications have been verified.        Objective   /64 (BP Location: Right arm, Patient Position: Sitting)   Pulse 74   Temp 97.8 °F (36.6 °C) (Tympanic)   Resp 18   Ht 5' (1.524 m)   Wt 73.9 kg (163 lb)   SpO2 98%   BMI 31.83 kg/m²   No LMP recorded.       Physical Exam     Physical Exam  Vitals and nursing note reviewed.   Constitutional:       General: She is not in acute distress.     Appearance: Normal appearance. She is normal weight. She is not ill-appearing, toxic-appearing or diaphoretic.   HENT:      Head: Normocephalic and atraumatic.      Right Ear: External ear normal.      Left Ear: External ear normal.      Nose: Nose normal.      Mouth/Throat:      Mouth: Mucous membranes are moist.      Pharynx: No oropharyngeal exudate or posterior oropharyngeal erythema.   Eyes:      General: No visual field deficit or scleral icterus.        Right eye: No discharge.         Left eye: No  discharge.      Extraocular Movements: Extraocular movements intact.      Pupils: Pupils are equal, round, and reactive to light.   Neck:      Vascular: No carotid bruit.   Cardiovascular:      Rate and Rhythm: Normal rate and regular rhythm.      Pulses: Normal pulses.           Radial pulses are 2+ on the right side and 2+ on the left side.        Dorsalis pedis pulses are 2+ on the right side and 2+ on the left side.      Heart sounds: No murmur heard.     No friction rub. No gallop.   Pulmonary:      Effort: Pulmonary effort is normal. No respiratory distress.      Breath sounds: Normal breath sounds. No stridor. No wheezing, rhonchi or rales.   Chest:      Chest wall: No tenderness.   Abdominal:      General: Abdomen is flat. There is no distension.      Palpations: Abdomen is soft. There is no mass.      Tenderness: There is no abdominal tenderness. There is no right CVA tenderness, left CVA tenderness, guarding or rebound.      Hernia: No hernia is present.   Musculoskeletal:         General: No swelling, tenderness, deformity or signs of injury.      Cervical back: Normal range of motion and neck supple. No rigidity or tenderness.      Right lower leg: No edema.      Left lower leg: No edema.   Lymphadenopathy:      Cervical: No cervical adenopathy.   Skin:     General: Skin is warm and dry.      Capillary Refill: Capillary refill takes less than 2 seconds.      Coloration: Skin is not jaundiced or pale.      Findings: No bruising, erythema, lesion or rash.   Neurological:      General: No focal deficit present.      Mental Status: She is alert and oriented to person, place, and time.      GCS: GCS eye subscore is 4. GCS verbal subscore is 5. GCS motor subscore is 6.      Cranial Nerves: No cranial nerve deficit, dysarthria or facial asymmetry.      Sensory: Sensory deficit present.      Motor: Weakness and pronator drift present. No tremor, atrophy, abnormal muscle tone or seizure activity.       Coordination: Coordination is intact. Coordination normal. Finger-Nose-Finger Test and Heel to Shin Test normal. Rapid alternating movements normal.      Gait: Gait is intact. Gait normal.      Comments: Right-sided facial numbness noted in the V2 and V3 distributions.  Right arm sensory deficits appreciated.  Right lower extremity sensory deficits and numbness appreciated to the knee however below the knee patient's endorsed hyperesthesia.    Right arm motor weakness appreciated.   Psychiatric:         Mood and Affect: Mood normal.         Behavior: Behavior normal.

## 2024-06-18 NOTE — ASSESSMENT & PLAN NOTE
"28-year-old female with no documented significant past medical history, who presented as a prehospital stroke alert on 6/18 with strokelike symptoms.  Patient reports that she went to bed the night prior with normal and woke up at approximately 4:15 AM on 6/18 with right facial/arm/leg numbness/tingling.  BP on presentation 137/86.  NIHSS 2 on admission.      Work up:  CT head without acute changes noted.  Due to contrast allergy, discussed with attending neurologist and decision was made to defer CTA H/N until patient was unable to undergo appropriate prep. patient was deemed not a TNK candidate due to being outside of the appropriate window/nondisabling symptoms.   Lipid Panel HDL 48 and the rest are within normal.  Hemoglobin A1c 5.6%  Unclear etiology at this time.   06/19/2024: Pt reports life stressors privately during rounds. MRI brain and C-spine with contrast does not show demyelinating findings. No discrete evidence of acute or subacute infarct. But it showed \"Partially empty sella and narrowing of distal bilateral transverse dural venous sinuses. Findings can be seen in patients with intracranial hypertension in the right clinical setting \".  Echo LVEF 65%    Plan:  Discontinue stroke pathway  Stopped aspirin  Continue Atorvastatin 40 mg od; to be adjusted or stopped by primary care doctor  Recommend normotension  Patient recommended to followup with Primary Care Doctor in 1 week  Patient recommended to followup with General Neurology in 4 weeks.  Patient recommended to followup with Psychiatry for therapy; referral ordered    "

## 2024-06-18 NOTE — CONSULTS
Consultation - Stroke   Ame Bonilla 28 y.o. female MRN: [id number]  Unit/Bed#: ED 23 Encounter: [id number]      Assessment & Plan     Stroke-like symptoms  Assessment & Plan  28-year-old female with no documented significant past medical history, who presented as a prehospital stroke alert on 6/18 with strokelike symptoms.  Patient reports that she went to bed the night prior with normal and woke up at approximately 4:15 AM on 6/18 with right facial/arm/leg numbness/tingling.  BP on presentation 137/86.  NIHSS 2.  CT head without acute changes noted.  Due to contrast allergy, discussed with attending neurologist and decision was made to defer CTA H/N until patient was unable to undergo appropriate prep. patient was deemed not a TNK candidate due to being outside of the appropriate window/nondisabling symptoms. Unclear etiology at this time. Workup as noted below.    Plan:  - Stroke pathway  CTA head and neck after prep for contrast allergy  MRI brain  Echo  Lipid Panel  Hemoglobin A1c  S/p aspirin 325 mg x 1, start aspirin 81 mg tomorrow AM  Atorvastatin 40 mg  Permissive HTN, labetalol if SBP >200  Continue telemetry  PT/OT/ST  Frequent neuro checks. Continue to monitor and notify neurology with any changes.   - Medical management and supportive care per primary team. Correction of any metabolic or infectious disturbances.         Thrombolytic Decision: Patient not a candidate. Unclear time of onset outside appropriate time window. and Symptoms resolved/clearly non disabling.      Recommendations for outpatient neurological follow up have yet to be determined.    Case and treatment plan reviewed with attending neurologist, Dr. Horn. Please see attending attestation for any further recommendations.      History of Present Illness     Reason for Consult / Principal Problem: Stroke-like symptoms  Patient last known well: 6/17 9pm  Stroke alert called: Pre-hospital 9:21 am  Neurology time of arrival: 9:25  am  HPI: Ame Bonilla is a 28 y.o. right handed female with no documented significant past medical history, who presents with stroke-like symptoms.    Patient reports she went to bed normal last night around 9 pm. She woke up around 4:15 am and noticed R facial/arm/leg numbness/tingling. She denies any numbness/tingling in her R torso. Her symptoms continue to persist. Pre-hospital stroke alert was initiated. Patient denies any weakness, headaches, neck pain, back pain, vision changes, dizziness/lightheadedness, speech difficulty. She reports she was able to ambulate but her R leg felt numb when she did. She is not on AC/AP at baseline. She does endorse increased personal stressors at home. She has not had these symptoms before.     Consult to Neurology  Consult performed by: BRAXTON Bhat  Consult ordered by: Lefty Elizabeth MD          Review of Systems  A 12 system ROS was completed. Other than the above mentioned complaints in the HPI and those commented on below, all remaining systems were negative.    Historical Information   Past Medical History:   Diagnosis Date    Varicella     as child      Past Surgical History:   Procedure Laterality Date     SECTION      TUBAL LIGATION       Social History   Social History     Substance and Sexual Activity   Alcohol Use No     Social History     Substance and Sexual Activity   Drug Use No     E-Cigarette/Vaping    E-Cigarette Use Never User      E-Cigarette/Vaping Substances     Social History     Tobacco Use   Smoking Status Never   Smokeless Tobacco Never     Family History:   Family History   Problem Relation Age of Onset    Diabetes Mother     No Known Problems Father     Diabetes Brother        Review of previous medical records was completed.     Meds/Allergies   all current active meds have been reviewed, current meds:   Current Facility-Administered Medications   Medication Dose Route Frequency    diphenhydrAMINE (BENADRYL) injection 50 mg  50  mg Intravenous Once   , and PTA meds:   Prior to Admission Medications   Prescriptions Last Dose Informant Patient Reported? Taking?   naproxen (Naprosyn) 500 mg tablet   No No   Sig: Take 1 tablet (500 mg total) by mouth 3 (three) times a day as needed for moderate pain   Patient not taking: Reported on 5/20/2023      Facility-Administered Medications: None       Allergies   Allergen Reactions    Latex Rash    Iodinated Contrast Media Hives    Percocet [Oxycodone-Acetaminophen] Rash       Objective   Vitals:Blood pressure 104/58, pulse 68, temperature 98.6 °F (37 °C), temperature source Oral, resp. rate 12, weight 71.5 kg (157 lb 8.3 oz), SpO2 99%, not currently breastfeeding.,Body mass index is 30.76 kg/m².  No intake or output data in the 24 hours ending 06/18/24 1110    Invasive Devices:   Invasive Devices       Peripheral Intravenous Line  Duration             Peripheral IV 06/18/24 Left Antecubital <1 day                    Physical Exam  Vitals and nursing note reviewed.   Constitutional:       General: She is not in acute distress.     Appearance: Normal appearance. She is not ill-appearing or diaphoretic.   HENT:      Head: Normocephalic.      Mouth/Throat:      Mouth: Mucous membranes are moist.      Pharynx: Oropharynx is clear.   Eyes:      General: No scleral icterus.        Right eye: No discharge.         Left eye: No discharge.      Extraocular Movements: Extraocular movements intact and EOM normal.      Conjunctiva/sclera: Conjunctivae normal.   Cardiovascular:      Rate and Rhythm: Normal rate.   Pulmonary:      Effort: Pulmonary effort is normal. No respiratory distress.   Musculoskeletal:         General: Normal range of motion.   Skin:     General: Skin is warm and dry.      Coloration: Skin is not jaundiced or pale.   Neurological:      Mental Status: She is alert and oriented to person, place, and time.      Coordination: Finger-Nose-Finger Test and Heel to Shin Test normal.      Deep Tendon  Reflexes:      Reflex Scores:       Bicep reflexes are 2+ on the right side and 2+ on the left side.       Brachioradialis reflexes are 2+ on the right side and 2+ on the left side.       Patellar reflexes are 2+ on the right side and 2+ on the left side.  Psychiatric:         Mood and Affect: Mood normal.       Neurologic Exam     Mental Status   Oriented to person, place, and time.   Level of consciousness: alert  Able to follow commands appropriately. No aphasia or dysarthria noted.     Cranial Nerves     CN II   Right visual field deficit: none  Left visual field deficit: none     CN III, IV, VI   Extraocular motions are normal.     CN VII   Facial expression full, symmetric.     CN IX, X   Palate: symmetric    CN XI   CN XI normal.     CN XII   CN XII normal.   Decreased sensation to pinprick in R forehead, cheek, and mandible  Reports tingling to light touch in R cheek/mandible     Motor Exam   Muscle bulk: normal  LUE strength 5/5 deltoid, bicep, triceps, hand   RUE strength 4/5 deltoid, 3/5 bicep, 3/5 triceps, 5/5 hand   LLE strength 5/5 hip flexion, knee flexion, knee extension, dorsiflexion, plantar flexion  RLE strength 4/5 hip flexion, 4-/5 knee flexion, 5/5 knee extension, 5/5 dorsiflexion, 3/5 plantar flexion       Sensory Exam   Right arm vibration: decreased from fingers  Left arm vibration: normal  Right leg vibration: decreased from toes  Left leg vibration: normal  Patchy sensation testing to light touch/pinprick/temperature in RUE/RLE     Gait, Coordination, and Reflexes     Coordination   Finger to nose coordination: normal  Heel to shin coordination: normal    Tremor   Resting tremor: absent  Intention tremor: absent    Reflexes   Right brachioradialis: 2+  Left brachioradialis: 2+  Right biceps: 2+  Left biceps: 2+  Right patellar: 2+  Left patellar: 2+      NIHSS:  1a.Level of Consciousness: 0 = Alert   1b. LOC Questions: 0 = Answers both correctly   1c. LOC Commands: 0 = Obeys both  "correctly   2. Best Gaze: 0 = Normal   3. Visual: 0 = No visual field loss   4. Facial Palsy: 0=Normal symmetric movement   5a. Motor Right Arm: 1=Drift, limb holds 90 (or 45) degrees but drifts down before full 10 seconds: does not hit bed   5b. Motor Left Arm: 0=No drift, limb holds 90 (or 45) degrees for full 10 seconds   6a. Motor Right Le=No drift, limb holds 90 (or 45) degrees for full 10 seconds   6b. Motor Left Le=No drift, limb holds 90 (or 45) degrees for full 10 seconds   7. Limb Ataxia:  0=Absent   8. Sensory: 1=Mild to moderate sensory loss; patient feels pinprick is less sharp or is dull on the affected side; there is a loss of superficial pain with pinprick but patient is aware She is being touched   9. Best Language:  0=No aphasia, normal   10. Dysarthria: 0=Normal articulation   11. Extinction and Inattention (formerly Neglect): 0=No abnormality   Total Score: 2     Time NIHSS was completed: 9:36 am    Modified [place] Score:  1 (No significant disability. Able to carry out all usual activities, despite some symptoms)    Lab Results: I have personally reviewed pertinent reports.  , CBC:   Results from last 7 days   Lab Units 24  0937   WBC Thousand/uL 8.01   RBC Million/uL 4.53   HEMOGLOBIN g/dL 13.5   HEMATOCRIT % 40.1   MCV fL 89   PLATELETS Thousands/uL 258   , BMP/CMP:   Results from last 7 days   Lab Units 24  0937   SODIUM mmol/L 137   POTASSIUM mmol/L 4.0   CHLORIDE mmol/L 105   CO2 mmol/L 25   BUN mg/dL 9   CREATININE mg/dL 0.61   CALCIUM mg/dL 8.9   EGFR ml/min/1.73sq m 122   , Vitamin B12:   , HgBA1C:   , TSH:   , Coagulation:   Results from last 7 days   Lab Units 24  0937   INR  1.00   , Lipid Profile:   , Ammonia:   , Urinalysis:       Invalid input(s): \"URIBILINOGEN\", Drug Screen:   , Medication Drug Levels:       Invalid input(s): \"CARBAMAZEPINE\", \"OXCARBAZEPINE\"    Imaging Studies: I have personally reviewed pertinent reports.   and I have personally reviewed " pertinent films in PACS  EKG, Pathology, and Other Studies: I have personally reviewed pertinent reports.    VTE Prophylaxis: Reason for no pharmacologic prophylaxis Currently in ED    Code Status: No Order  Advance Directive and Living Will:      Power of :    POLST:      Counseling / Coordination of Care  Total Critical Care time spent 34 minutes excluding procedures, teaching and family updates.

## 2024-06-18 NOTE — LETTER
[facility]  [address] [address]  Dept: [phone]    June 20, 2024     Patient: Ame Bonilla   YOB: 1995   Date of Visit: 6/18/2024       To Whom it May Concern:    Ame Bonilla is under my professional care. She was seen in the hospital from 6/18/2024 to 06/20/24. She may return to work on Monday 6/24/2024 without limitations.    If you have any questions or concerns, please don't hesitate to call.         Sincerely,          Jos eR Tidwell MD

## 2024-06-18 NOTE — ED PROVIDER NOTES
History  Chief Complaint   Patient presents with    STROKE Alert     28-year-old female presents emergency department as a prehospital stroke alert.  Last known normal was 9 PM last night.  She awoke around 430 this morning complaining of numbness on the right half of face, right arm and right leg.  She states she has muscle strength weakness and difficulty with walking.  Per EMS she also has right arm weakness on physical exam.  She denies any past medical history.  No falls or trauma.  Does not take blood thinners.        Prior to Admission Medications   Prescriptions Last Dose Informant Patient Reported? Taking?   naproxen (Naprosyn) 500 mg tablet Not Taking  No No   Sig: Take 1 tablet (500 mg total) by mouth 3 (three) times a day as needed for moderate pain   Patient not taking: Reported on 2023      Facility-Administered Medications: None       Past Medical History:   Diagnosis Date    Varicella     as child        Past Surgical History:   Procedure Laterality Date     SECTION      TUBAL LIGATION         Family History   Problem Relation Age of Onset    Diabetes Mother     No Known Problems Father     Diabetes Brother      I have reviewed and agree with the history as documented.    E-Cigarette/Vaping    E-Cigarette Use Never User      E-Cigarette/Vaping Substances     Social History     Tobacco Use    Smoking status: Never    Smokeless tobacco: Never   Vaping Use    Vaping status: Never Used   Substance Use Topics    Alcohol use: No    Drug use: No        Review of Systems   Neurological:  Positive for weakness and numbness.   All other systems reviewed and are negative.      Physical Exam  ED Triage Vitals   Temperature Pulse Respirations Blood Pressure SpO2   24 0948 24 0930 24 0930 24 0930 24 0930   98.6 °F (37 °C) 91 16 137/86 100 %      Temp Source Heart Rate Source Patient Position - Orthostatic VS BP Location FiO2 (%)   24 0948 24 0930 24 1600  06/18/24 1819 --   Oral Monitor Lying Left arm       Pain Score       --                    Orthostatic Vital Signs  Vitals:    06/18/24 1700 06/18/24 1800 06/18/24 1818 06/18/24 1819   BP: 112/65 110/67 121/67 121/67   Pulse: 96 86 76 81   Patient Position - Orthostatic VS:    Lying       Physical Exam  Vitals and nursing note reviewed.   Constitutional:       General: She is not in acute distress.     Appearance: She is well-developed.   HENT:      Head: Normocephalic and atraumatic.   Eyes:      Conjunctiva/sclera: Conjunctivae normal.   Cardiovascular:      Rate and Rhythm: Normal rate and regular rhythm.      Heart sounds: No murmur heard.  Pulmonary:      Effort: Pulmonary effort is normal. No respiratory distress.      Breath sounds: Normal breath sounds.   Abdominal:      Palpations: Abdomen is soft.      Tenderness: There is no abdominal tenderness.   Musculoskeletal:         General: No swelling.      Cervical back: Neck supple.   Skin:     General: Skin is warm and dry.      Capillary Refill: Capillary refill takes less than 2 seconds.   Neurological:      Mental Status: She is alert.      GCS: GCS eye subscore is 4. GCS verbal subscore is 5. GCS motor subscore is 6.      Cranial Nerves: Cranial nerves 2-12 are intact.      Sensory: Sensation is intact.      Motor: Motor function is intact. No weakness, tremor or atrophy.      Coordination: Coordination is intact. Finger-Nose-Finger Test normal.      Comments: Finger-nose testing intact bilaterally.  Right arm pronator drift however no bed strike.  4 out of 5 muscle strength in right arm.  Able to keep right leg off bed.  Diminished sensation on the right face, arm and leg.   Psychiatric:         Mood and Affect: Mood normal.         ED Medications  Medications   aspirin (ECOTRIN LOW STRENGTH) EC tablet 81 mg (has no administration in time range)   atorvastatin (LIPITOR) tablet 40 mg (40 mg Oral Given 6/18/24 1847)   hydrocortisone (Solu-CORTEF) injection  200 mg (200 mg Intravenous Given 6/18/24 0941)     Followed by   diphenhydrAMINE (BENADRYL) injection 50 mg (50 mg Intravenous Given 6/18/24 1341)   aspirin tablet 325 mg (325 mg Oral Given 6/18/24 1030)   iohexol (OMNIPAQUE) 350 MG/ML injection (SINGLE-DOSE) 80 mL (80 mL Intravenous Given 6/18/24 1526)       Diagnostic Studies  Results Reviewed       Procedure Component Value Units Date/Time    Pregnancy Test (HCG Qualitative) [[id number]]     Lab Status: No result Specimen: Blood     Rapid drug screen, urine [[id number]]  (Normal) Collected: 06/18/24 1320    Lab Status: Final result Specimen: Urine, Clean Catch Updated: 06/18/24 1354     Amph/Meth UR Negative     Barbiturate Ur Negative     Benzodiazepine Urine Negative     Cocaine Urine Negative     Methadone Urine Negative     Opiate Urine Negative     PCP Ur Negative     THC Urine Negative     Oxycodone Urine Negative     Fentanyl Urine Negative     HYDROCODONE URINE Negative    Narrative:      FOR MEDICAL PURPOSES ONLY.   IF CONFIRMATION NEEDED PLEASE CONTACT THE LAB WITHIN 5 DAYS.    Drug Screen Cutoff Levels:  AMPHETAMINE/METHAMPHETAMINES  1000 ng/mL  BARBITURATES     200 ng/mL  BENZODIAZEPINES     200 ng/mL  COCAINE      300 ng/mL  METHADONE      300 ng/mL  OPIATES      300 ng/mL  PHENCYCLIDINE     25 ng/mL  THC       50 ng/mL  OXYCODONE      100 ng/mL  FENTANYL      5 ng/mL  HYDROCODONE     300 ng/mL    HS Troponin I 2hr [[id number]]  (Normal) Collected: 06/18/24 1118    Lab Status: Final result Specimen: Blood from Arm, Left Updated: 06/18/24 1157     hs TnI 2hr 3 ng/L      Delta 2hr hsTnI 0 ng/L     FLU/RSV/COVID - if FLU/RSV clinically relevant [[id number]]  (Normal) Collected: 06/18/24 0937    Lab Status: Final result Specimen: Nares from Nose Updated: 06/18/24 1024     SARS-CoV-2 Negative     INFLUENZA A PCR Negative     INFLUENZA B PCR Negative     RSV PCR Negative    Narrative:      FOR PEDIATRIC PATIENTS - copy/paste COVID Guidelines URL to browser:  [email or web address]    SARS-CoV-2 assay is a Nucleic Acid Amplification assay intended for the  qualitative detection of nucleic acid from SARS-CoV-2 in nasopharyngeal  swabs. Results are for the presumptive identification of SARS-CoV-2 RNA.    Positive results are indicative of infection with SARS-CoV-2, the virus  causing COVID-19, but do not rule out bacterial infection or co-infection  with other viruses. Laboratories within the [place] and its  territories are required to report all positive results to the appropriate  public health authorities. Negative results do not preclude SARS-CoV-2  infection and should not be used as the sole basis for treatment or other  patient management decisions. Negative results must be combined with  clinical observations, patient history, and epidemiological information.  This test has not been FDA cleared or approved.    This test has been authorized by FDA under an Emergency Use Authorization  (EUA). This test is only authorized for the duration of time the  declaration that circumstances exist justifying the authorization of the  emergency use of an in vitro diagnostic tests for detection of SARS-CoV-2  virus and/or diagnosis of COVID-19 infection under section 564(b)(1) of  the Act, 21 U.S.C. 360bbb-3(b)(1), unless the authorization is terminated  or revoked sooner. The test has been validated but independent review by FDA  and CLIA is pending.    Test performed using [employer] GeneXpert: This RT-PCR assay targets N2,  a region unique to SARS-CoV-2. A conserved region in the E-gene was chosen  for pan-Sarbecovirus detection which includes SARS-CoV-2.    According to CMS-2020-01-R, this platform meets the definition of high-throughput technology.    HS Troponin 0hr (reflex protocol) [[id number]]  (Normal) Collected: 06/18/24 0937    Lab Status: Final result Specimen: Blood from Arm, Left Updated: 06/18/24 1013     hs  TnI 0hr 3 ng/L     CBC and Platelet [[id number]]  (Normal) Collected: 06/18/24 0937    Lab Status: Final result Specimen: Blood from Arm, Left Updated: 06/18/24 1007     WBC 8.01 Thousand/uL      RBC 4.53 Million/uL      Hemoglobin 13.5 g/dL      Hematocrit 40.1 %      MCV 89 fL      MCH 29.8 pg      MCHC 33.7 g/dL      RDW 13.3 %      Platelets 258 Thousands/uL      MPV 11.1 fL     Basic metabolic panel [[id number]] Collected: 06/18/24 0937    Lab Status: Final result Specimen: Blood from Arm, Left Updated: 06/18/24 1007     Sodium 137 mmol/L      Potassium 4.0 mmol/L      Chloride 105 mmol/L      CO2 25 mmol/L      ANION GAP 7 mmol/L      BUN 9 mg/dL      Creatinine 0.61 mg/dL      Glucose 101 mg/dL      Calcium 8.9 mg/dL      eGFR 122 ml/min/1.73sq m     Narrative:      National Kidney Disease Foundation guidelines for Chronic Kidney Disease (CKD):     Stage 1 with normal or high GFR (GFR > 90 mL/min/1.73 square meters)    Stage 2 Mild CKD (GFR = 60-89 mL/min/1.73 square meters)    Stage 3A Moderate CKD (GFR = 45-59 mL/min/1.73 square meters)    Stage 3B Moderate CKD (GFR = 30-44 mL/min/1.73 square meters)    Stage 4 Severe CKD (GFR = 15-29 mL/min/1.73 square meters)    Stage 5 End Stage CKD (GFR <15 mL/min/1.73 square meters)  Note: GFR calculation is accurate only with a steady state creatinine    Protime-INR [[id number]]  (Normal) Collected: 06/18/24 0937    Lab Status: Final result Specimen: Blood from Arm, Left Updated: 06/18/24 1005     Protime 13.1 seconds      INR 1.00    APTT [[id number]]  (Normal) Collected: 06/18/24 0937    Lab Status: Final result Specimen: Blood from Arm, Left Updated: 06/18/24 1005     PTT 28 seconds     Fingerstick Glucose (POCT) [[id number]]  (Normal) Collected: 06/18/24 0946    Lab Status: Final result Specimen: Blood Updated: 06/18/24 0947     POC Glucose 95 mg/dl                    CTA stroke alert (head/neck)   Final Result by Jennifer Hdez MD (06/18 1160)      CT Brain:      1.  No  acute intracranial abnormality. No significant interval change.   2.  Partially empty sella which can be seen in intracranial hypertension in the right clinical setting.   3.  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 Recommend follow-up with brain MRI without and with contrast to complete characterization.      CT Angiography: Unremarkable CTA neck and brain.                     Findings were directly discussed with Mikey Shukla  at 3:30 pm                           Workstation performed: [id number]         CT stroke alert brain   Final Result by Jennifer Hdez MD (06/18 0947)      1.  No acute intracranial CT abnormality.   2.  Partially empty sella which can be seen in intracranial hypertension in the right clinical setting.   3.  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 Recommend follow-up brain MRI without and with contrast for complete characterization.                  Findings were directly discussed with Johnny Horn at approximately 9:42 am.      Workstation performed: [id number]         MRI Inpatient Order    (Results Pending)         Procedures  Procedures      ED Course                  Stroke Assessment       Row Name 06/18/24 0926             NIH Stroke Scale    Interval Baseline      Level of Consciousness (1a.) 0      LOC Questions (1b.) 0      LOC Commands (1c.) 0      Best Gaze (2.) 0      Visual (3.) 0      Facial Palsy (4.) 0      Motor Arm, Left (5a.) 0      Motor Arm, Right (5b.) 1      Motor Leg, Left (6a.) 0      Motor Leg, Right (6b.) 0      Limb Ataxia (7.) 0      Sensory (8.) 1      Best Language (9.) 0      Dysarthria (10.) 0      Extinction and Inattention (11.) (Formerly Neglect) 0      Total 2                    Flowsheet Row Most Recent Value   Thrombolytic Decision Options    Thrombolytic Decision Patient not a candidate.   Patient is not a candidate options Unclear time of onset outside appropriate time window.                              Medical Decision Making      History and physical exam most consistent with  "complex migraine. However, differential diagnosis included but not limited to stroke.     Plan: Stroke alert admission for stroke pathway    View ED course above for further discussion on patient workup.     On review of previous records no past medical history.    All labs reviewed and utilized in the medical decision making process  All radiology studies independently viewed by me and interpreted by the radiologist.  I reviewed all testing with the patient.     Upon re-evaluation patient is still having persistent symptoms with right arm pronator drift and diminished sensation in the right half of her body.    Disposition: admitted to neurology on stroke pathway    Portions of the record may have been created with voice recognition software. Occasional wrong word or \"sound a like\" substitutions may have occurred due to the inherent limitations of voice recognition software. Read the chart carefully and recognize, using context, where substitutions have occurred.      Amount and/or Complexity of Data Reviewed  Labs: ordered.  Radiology: ordered.    Risk  OTC drugs.  Prescription drug management.  Decision regarding hospitalization.          Disposition  Final diagnoses:   Cerebrovascular accident (CVA), unspecified mechanism (HCC)     Time reflects when diagnosis was documented in both MDM as applicable and the Disposition within this note       Time User Action Codes Description Comment    6/18/2024  9:21 AM Markle, Brooke M Add [I63.9] Cerebrovascular accident (CVA), unspecified mechanism (HCC)     6/18/2024  3:49 PM Jose R Tidwell Add [R29.90] Stroke-like symptoms           ED Disposition       ED Disposition   Admit    Condition   Stable    Date/Time   Tue Jun 18, 2024  3:14 PM    Comment   Case was discussed with neuro resident and the patient's admission status was agreed to be Admission Status: inpatient status to the service of Dr. Jensen .               Follow-up Information    None         Current Discharge " Medication List        CONTINUE these medications which have NOT CHANGED    Details   naproxen (Naprosyn) 500 mg tablet Take 1 tablet (500 mg total) by mouth 3 (three) times a day as needed for moderate pain  Qty: 90 tablet, Refills: 0    Associated Diagnoses: Cubital tunnel syndrome on right           No discharge procedures on file.    PDMP Review         Value Time User    PDMP Reviewed  Yes 6/18/2024  3:39 PM Jose R Tidwell MD             ED Provider  Attending physically available and evaluated Ame S Bonilla. I managed the patient along with the ED Attending.    Electronically Signed by           Dat Capellan DO  06/18/24 2003

## 2024-06-19 ENCOUNTER — APPOINTMENT (INPATIENT)
Dept: NON INVASIVE DIAGNOSTICS | Facility: HOSPITAL | Age: 29
DRG: 058 | End: 2024-06-19
Payer: MEDICARE

## 2024-06-19 ENCOUNTER — APPOINTMENT (OUTPATIENT)
Dept: RADIOLOGY | Facility: HOSPITAL | Age: 29
DRG: 058 | End: 2024-06-19
Payer: MEDICARE

## 2024-06-19 LAB
ALBUMIN SERPL BCG-MCNC: 3.6 G/DL (ref 3.5–5)
ALP SERPL-CCNC: 85 U/L (ref 34–104)
ALT SERPL W P-5'-P-CCNC: 12 U/L (ref 7–52)
ANION GAP SERPL CALCULATED.3IONS-SCNC: 9 MMOL/L (ref 4–13)
AORTIC ROOT: 3 CM
AORTIC VALVE MEAN VELOCITY: 9 M/S
APICAL FOUR CHAMBER EJECTION FRACTION: 70 %
ASCENDING AORTA: 3 CM
AST SERPL W P-5'-P-CCNC: 11 U/L (ref 13–39)
AV AREA BY CONTINUOUS VTI: 2.7 CM2
AV AREA PEAK VELOCITY: 3 CM2
AV LVOT MEAN GRADIENT: 3 MMHG
AV LVOT PEAK GRADIENT: 6 MMHG
AV MEAN GRADIENT: 4 MMHG
AV PEAK GRADIENT: 7 MMHG
AV VALVE AREA: 2.7 CM2
AV VELOCITY RATIO: 0.95
BILIRUB SERPL-MCNC: 0.25 MG/DL (ref 0.2–1)
BSA FOR ECHO PROCEDURE: 1.68 M2
BUN SERPL-MCNC: 12 MG/DL (ref 5–25)
CALCIUM SERPL-MCNC: 8.1 MG/DL (ref 8.4–10.2)
CHLORIDE SERPL-SCNC: 104 MMOL/L (ref 96–108)
CHOLEST SERPL-MCNC: 159 MG/DL
CO2 SERPL-SCNC: 25 MMOL/L (ref 21–32)
CREAT SERPL-MCNC: 0.56 MG/DL (ref 0.6–1.3)
DOP CALC AO PEAK VEL: 1.31 M/S
DOP CALC AO VTI: 25.17 CM
DOP CALC LVOT AREA: 3.14 CM2
DOP CALC LVOT CARDIAC INDEX: 3.31 L/MIN/M2
DOP CALC LVOT CARDIAC OUTPUT: 5.59 L/MIN
DOP CALC LVOT DIAMETER: 2 CM
DOP CALC LVOT PEAK VEL VTI: 21.62 CM
DOP CALC LVOT PEAK VEL: 1.24 M/S
DOP CALC LVOT STROKE INDEX: 40.8 ML/M2
DOP CALC LVOT STROKE VOLUME: 67.89
E WAVE DECELERATION TIME: 211 MS
E/A RATIO: 1.04
ERYTHROCYTE [DISTWIDTH] IN BLOOD BY AUTOMATED COUNT: 13.4 % (ref 11.6–15.1)
EST. AVERAGE GLUCOSE BLD GHB EST-MCNC: 114 MG/DL
FRACTIONAL SHORTENING: 35 (ref 28–44)
GFR SERPL CREATININE-BSD FRML MDRD: 125 ML/MIN/1.73SQ M
GLUCOSE SERPL-MCNC: 104 MG/DL (ref 65–140)
HBA1C MFR BLD: 5.6 %
HCT VFR BLD AUTO: 36 % (ref 34.8–46.1)
HDLC SERPL-MCNC: 48 MG/DL
HGB BLD-MCNC: 12.1 G/DL (ref 11.5–15.4)
INTERVENTRICULAR SEPTUM IN DIASTOLE (PARASTERNAL SHORT AXIS VIEW): 0.8 CM
INTERVENTRICULAR SEPTUM: 0.8 CM (ref 0.6–1.1)
LAAS-AP2: 15.1 CM2
LAAS-AP4: 14.3 CM2
LDLC SERPL CALC-MCNC: 90 MG/DL (ref 0–100)
LEFT ATRIUM SIZE: 2.6 CM
LEFT ATRIUM VOLUME (MOD BIPLANE): 37 ML
LEFT ATRIUM VOLUME INDEX (MOD BIPLANE): 21.9 ML/M2
LEFT INTERNAL DIMENSION IN SYSTOLE: 2.6 CM (ref 2.1–4)
LEFT VENTRICULAR INTERNAL DIMENSION IN DIASTOLE: 4 CM (ref 3.5–6)
LEFT VENTRICULAR POSTERIOR WALL IN END DIASTOLE: 0.9 CM
LEFT VENTRICULAR STROKE VOLUME: 49 ML
LVSV (TEICH): 49 ML
MCH RBC QN AUTO: 29.9 PG (ref 26.8–34.3)
MCHC RBC AUTO-ENTMCNC: 33.6 G/DL (ref 31.4–37.4)
MCV RBC AUTO: 89 FL (ref 82–98)
MV E'TISSUE VEL-LAT: 20 CM/S
MV E'TISSUE VEL-SEP: 12 CM/S
MV PEAK A VEL: 0.71 M/S
MV PEAK E VEL: 74 CM/S
PLATELET # BLD AUTO: 324 THOUSANDS/UL (ref 149–390)
PMV BLD AUTO: 9.9 FL (ref 8.9–12.7)
POTASSIUM SERPL-SCNC: 3.6 MMOL/L (ref 3.5–5.3)
PROT SERPL-MCNC: 6.2 G/DL (ref 6.4–8.4)
RBC # BLD AUTO: 4.05 MILLION/UL (ref 3.81–5.12)
RIGHT ATRIUM AREA SYSTOLE A4C: 15.4 CM2
RIGHT VENTRICLE ID DIMENSION: 3.2 CM
SINOTUBULAR JUNCTION: 2.5 CM
SL CV LEFT ATRIUM LENGTH A2C: 4.6 CM
SL CV LV EF: 65
SL CV PED ECHO LEFT VENTRICLE DIASTOLIC VOLUME (MOD BIPLANE) 2D: 72 ML
SL CV PED ECHO LEFT VENTRICLE SYSTOLIC VOLUME (MOD BIPLANE) 2D: 23 ML
SL CV SINUS OF VALSALVA 2D: 3.1 CM
SODIUM SERPL-SCNC: 138 MMOL/L (ref 135–147)
STJ: 2.5 CM
TRIGL SERPL-MCNC: 105 MG/DL
WBC # BLD AUTO: 9.11 THOUSAND/UL (ref 4.31–10.16)

## 2024-06-19 PROCEDURE — 80061 LIPID PANEL: CPT | Performed by: PSYCHIATRY & NEUROLOGY

## 2024-06-19 PROCEDURE — 97110 THERAPEUTIC EXERCISES: CPT

## 2024-06-19 PROCEDURE — 93306 TTE W/DOPPLER COMPLETE: CPT

## 2024-06-19 PROCEDURE — 83036 HEMOGLOBIN GLYCOSYLATED A1C: CPT | Performed by: PSYCHIATRY & NEUROLOGY

## 2024-06-19 PROCEDURE — 93306 TTE W/DOPPLER COMPLETE: CPT | Performed by: INTERNAL MEDICINE

## 2024-06-19 PROCEDURE — 70553 MRI BRAIN STEM W/O & W/DYE: CPT

## 2024-06-19 PROCEDURE — 72156 MRI NECK SPINE W/O & W/DYE: CPT

## 2024-06-19 PROCEDURE — 85027 COMPLETE CBC AUTOMATED: CPT | Performed by: PSYCHIATRY & NEUROLOGY

## 2024-06-19 PROCEDURE — 97166 OT EVAL MOD COMPLEX 45 MIN: CPT

## 2024-06-19 PROCEDURE — A9585 GADOBUTROL INJECTION: HCPCS | Performed by: PSYCHIATRY & NEUROLOGY

## 2024-06-19 PROCEDURE — 97162 PT EVAL MOD COMPLEX 30 MIN: CPT

## 2024-06-19 PROCEDURE — 99233 SBSQ HOSP IP/OBS HIGH 50: CPT | Performed by: PSYCHIATRY & NEUROLOGY

## 2024-06-19 PROCEDURE — 80053 COMPREHEN METABOLIC PANEL: CPT | Performed by: PSYCHIATRY & NEUROLOGY

## 2024-06-19 RX ORDER — GADOBUTROL 604.72 MG/ML
7 INJECTION INTRAVENOUS
Status: COMPLETED | OUTPATIENT
Start: 2024-06-19 | End: 2024-06-19

## 2024-06-19 RX ADMIN — ATORVASTATIN CALCIUM 40 MG: 40 TABLET, FILM COATED ORAL at 17:51

## 2024-06-19 RX ADMIN — GADOBUTROL 7 ML: 604.72 INJECTION INTRAVENOUS at 11:29

## 2024-06-19 RX ADMIN — ASPIRIN 81 MG: 81 TABLET, COATED ORAL at 09:03

## 2024-06-19 NOTE — CONSULTS
PHYSICAL MEDICINE AND REHABILITATION CONSULT NOTE    e-Consult (IPC)   Ame Bonilla 28 y.o. female MRN: [id number]  Unit/Bed#: [facility] 709-01 Encounter: [id number]      Reason for Consult / Principal Problem: Assessment of rehabilitation needs post possible stroke  Inpatient consult to Physical Medicine Rehab  Consult performed by: Brionna Rosa MD  Consult ordered by: Jose R Tidwell MD        06/19/24      Assessment:  Rehabilitation Diagnosis:   Possible stroke  Right-sided weakness/numbness - improving    Recommendations:  Rehabilitation Plan:  Continue PT/OT/SLP while on acute care.  Personally reviewed therapy notes.  Based on current therapy evaluations and scoring, patient appears to be approaching her functional baseline.   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  Do not hesitate to contact service with further questions.      Brionna Rosa MD  PM&R      I have spent a total time of 37 minutes on 06/19/24 in caring for this patient including Impressions, Documenting in the medical record, Reviewing / ordering tests, medicine, procedures  , and Obtaining or reviewing history  .      History of Present Illness:  Ame Bonilla is a 28 y.o. female with  has a past medical history of Varicella..  Patient presented to the [facility] on 6/18/2024 with right-sided weakness and numbness.  NIHSS = 2.  CT head without acute abnormalities.  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  MRI brain negative for acute or subacute infarction.  Patient seen by neurology and loaded with aspirin.  Also started on  aspirin and atorvastatin.  They also recommended MRI C-spine to rule out demyelinating disease which was completed: MRI C-spine: Limited study however no overt findings.    Review of Systems: 10 point ROS negative except for what is noted in HPI    Function:  Prior level of function and living situation:  Patient resides with her significant other and 3 children in a multilevel home with stairs to enter.    Prior level of function: Completely independent with mobility and self-care without assistive devices.  Works full-time as a baker    Current level of function:  Physical Therapy: Supervision with bed mobility, supervision with transfers, supervision with ambulation 50 feet x 2 without assistive device.  Supervision with stair negotiation  Occupational Therapy: Independent with eating and grooming.  Supervision with upper and lower body ADLs, supervision with toileting.  Speech Therapy: Screened at lunch and cleared for regular diet with thin liquids.      Physical Exam:  /74 (BP Location: Left arm)   Pulse 73   Temp 98.5 °F (36.9 °C) (Oral)   Resp 20   Ht 5' (1.524 m)   Wt 71.2 kg (157 lb)   SpO2 97%   BMI 30.66 kg/m²        Intake/Output Summary (Last 24 hours) at 6/19/2024 1701  Last data filed at 6/19/2024 0846  Gross per 24 hour   Intake 280 ml   Output --   Net 280 ml       Body mass index is 30.66 kg/m².      Social History:    Social History     Socioeconomic History    Marital status: Single     Spouse name: None    Number of children: None    Years of education: None    Highest education level: None   Occupational History    None   Tobacco Use    Smoking status: Never    Smokeless tobacco: Never   Vaping Use    Vaping status: Never Used   Substance and Sexual Activity    Alcohol use: No    Drug use: No    Sexual activity: Yes     Partners: Male     Birth control/protection: Surgical     Comment: wishing to conceive   Other Topics Concern    None   Social History Narrative    None     Social  Determinants of Health     Financial Resource Strain: Low Risk  (5/20/2023)    Overall Financial Resource Strain (CARDIA)     Difficulty of Paying Living Expenses: Not hard at all   Food Insecurity: No Food Insecurity (6/19/2024)    Hunger Vital Sign     Worried About Running Out of Food in the Last Year: Never true     Ran Out of Food in the Last Year: Never true   Transportation Needs: No Transportation Needs (6/19/2024)    PRAPARE - Transportation     Lack of Transportation (Medical): No     Lack of Transportation (Non-Medical): No   Physical Activity: Not on file   Stress: Not on file   Social Connections: Not on file   Intimate Partner Violence: Unknown (11/21/2020)    Received from [facility]    Intimate Partner Violence     Within the last year, have you been afraid of your partner, ex-partner or family member?: Not on file     Within the last year, have you been humiliated or emotionally abused in other ways by your partner, ex-partner or family member?: Not on file     Within the last year, have you been kicked, hit, slapped, or otherwise physically hurt by your partner, ex-partner or family member?: Not on file     Within the last year, have you been raped or forced to have any kind of sexual activity by your partner, ex-partner or family member?: Not on file   Housing Stability: Low Risk  (6/19/2024)    Housing Stability Vital Sign     Unable to Pay for Housing in the Last Year: No     Number of Times Moved in the Last Year: 1     Homeless in the Last Year: No        Family History:    Family History   Problem Relation Age of Onset    Diabetes Mother     No Known Problems Father     Diabetes Brother          Medications:     Current Facility-Administered Medications:     aspirin (ECOTRIN LOW STRENGTH) EC tablet 81 mg, 81 mg, Oral, Daily, Jose R Tidwell MD, 81 mg at 06/19/24 0903    atorvastatin (LIPITOR) tablet 40 mg, 40 mg, Oral, QPM, Jose R Tidwell MD, 40 mg at 06/18/24 1847    perflutren lipid  microsphere (DEFINITY) injection, 0.8 mL/min, Intravenous, Once in imaging, Maris Alonso MD    Past Medical History:     Past Medical History:   Diagnosis Date    Varicella     as child         Past Surgical History:     Past Surgical History:   Procedure Laterality Date     SECTION      TUBAL LIGATION           Allergies:     Allergies   Allergen Reactions    Latex Rash    Iodinated Contrast Media Hives    Percocet [Oxycodone-Acetaminophen] Rash           LABORATORY RESULTS:      Lab Results   Component Value Date    HGB 12.1 2024    HCT 36.0 2024    WBC 9.11 2024     Lab Results   Component Value Date    BUN 12 2024    K 3.6 2024     2024    CREATININE 0.56 (L) 2024     Lab Results   Component Value Date    PROTIME 13.1 2024    INR 1.00 2024        DIAGNOSTIC STUDIES: Reviewed  MRI cervical spine w wo contrast    Result Date: 2024  Impression: 1.  Technically suboptimal image quality. No definite cord signal abnormality or pathologic enhancement. 2.  No significant degenerative change. Workstation performed: [id number]     MRI brain MS wo and w contrast    Result Date: 2024  Impression: 1.  No parenchymal signal abnormality or abnormal enhancement. 2.  Partially empty sella and narrowing of distal bilateral transverse dural venous sinuses. Findings can be seen in patients with intracranial hypertension in the right clinical setting. 3.  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 No discrete evidence of acute or subacute infarct. Pineal cyst, and other findings as above. Workstation performed: [id number]     CTA stroke alert (head/neck)    Result Date: 2024  Impression: CT Brain: 1.  No acute intracranial abnormality. No significant interval change. 2.  Partially empty sella which can be seen in intracranial hypertension in the right  clinical setting. 3.  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 Recommend follow-up with brain MRI without and with contrast to complete characterization. CT Angiography: Unremarkable CTA neck and brain. Findings were directly discussed with Mikey Shukla  at 3:30 pm Workstation performed: [id number]     CT stroke alert brain    Result Date: 6/18/2024  Impression: 1.  No acute intracranial CT abnormality. 2.  Partially empty sella which can be seen in intracranial hypertension in the right clinical setting. 3.  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 Recommend follow-up brain MRI without and with contrast for complete characterization. Findings were directly discussed with Johnny Horn at approximately 9:42 am. Workstation performed: [id number]

## 2024-06-19 NOTE — RESTORATIVE TECHNICIAN NOTE
Restorative Technician Note      Patient Name: Ame Bonilla     Note Type: Mobility  Patient Position Upon Consult: Supine  Activity Performed: Ambulated; Dangled; Stood  Assistive Device: Other (Comment) (none)  Education Provided: Yes  Patient Position at End of Consult: Supine; All needs within reach; Bed/Chair alarm activated    Anabela SHORT, Restorative Technician,

## 2024-06-19 NOTE — PHYSICAL THERAPY NOTE
Physical Therapy Evaluation    Patient's Name: Ame Bonilla    Admitting Diagnosis  Facial numbness [R20.0]  Stroke-like symptoms [R29.90]  Cerebrovascular accident (CVA), unspecified mechanism (HCC) [I63.9]    Problem List  Patient Active Problem List   Diagnosis    Bipolar depression (HCC)    Myopia of right eye    Cervical high risk human papillomavirus (HPV) DNA test positive    Obesity (BMI 30-39.9)    Encounter to discuss test results    Stroke-like symptoms       Past Medical History  Past Medical History:   Diagnosis Date    Varicella     as child        Past Surgical History  Past Surgical History:   Procedure Laterality Date     SECTION      TUBAL LIGATION          24 0855   PT Last Visit   PT Visit Date 24   Note Type   Note type Evaluation   Pain Assessment   Pain Assessment Tool 0-10   Pain Score No Pain   Restrictions/Precautions   Weight Bearing Precautions Per Order No   Home Living   Type of Home House   Home Layout Multi-level;Stairs to enter with rails   Bathroom Shower/Tub Tub/shower unit   Bathroom Toilet Standard   Prior Function   Level of [place] Independent with ADLs;Independent with functional mobility;Independent with IADLS   Lives With Significant other  (+ 3 children)   Receives Help From Family   IADLs Independent with driving;Independent with meal prep;Independent with medication management   Falls in the last 6 months 0   Vocational Full time employment  ([employer])   General   Family/Caregiver Present No   Cognition   Arousal/Participation Alert   Orientation Level Oriented X4   Comments pleasant + cooperative   Subjective   Subjective Agreeable to mobilize.   RLE Assessment   RLE Assessment   (/5)   LLE Assessment   LLE Assessment   (/5)   Coordination   Movements are Fluid and Coordinated 1   Sensation WFL   Heel to Shin Intact   Bed Mobility   Supine to Sit 5  Supervision   Sit to Supine 5  Supervision   Additional Comments Pt greeted in supine.    Transfers   Sit to Stand 5  Supervision   Stand to Sit 5  Supervision   Additional Comments no AD   Ambulation/Elevation   Gait pattern Improper Weight shift   Gait Assistance 5  Supervision   Assistive Device None   Distance 50'x2   Stair Management Assistance 5  Supervision   Stair Management Technique One rail R;Reciprocal;Nonreciprocal   Number of Stairs 7   Ambulation/Elevation Additional Comments Instructed pt in sequencing on stairs.   Balance   Static Sitting Good   Dynamic Sitting Good   Static Standing Fair +   Dynamic Standing Fair   Ambulatory Fair   Endurance Deficit   Endurance Deficit No   Activity Tolerance   Activity Tolerance Patient tolerated treatment well   Medical Staff Made Aware OT Dede, OTS Nataly   Nurse Made Aware yes - cleared for therapy   Assessment   Assessment Pt is 28 y.o. female seen for a PT evaluation s/p admit to [facility] on 6/18/2024. Pt presenting w/ R facial numbness and RUE + RLE numbness + weakness. Please see above for other active problem list / PMH.     PT now consulted to assess functional mobility and needs for safe d/c planning. Prior to admission, pt was I w/ ambulation w/o AD, lives w/ s/o + 3 children in a house w/ stairs.     Currently pt is S for bed skills; S for functional transfers; S for ambulation w/o AD; S for stair negotiation; 27/28 Tinetti indicating low risk for falls. Pt presents near functional baseline. No further skilled acute PT needs. Will d/c from caseload.   Goals   Patient Goals go home   PT Treatment Day 0   Plan   PT Frequency   (d/c PT)   Discharge Recommendation   Rehab Resource Intensity Level, PT (S)  No post-acute rehabilitation needs   AM-PAC Basic Mobility Inpatient   Turning in Flat Bed Without Bedrails 4   Lying on Back to Sitting on Edge of Flat Bed Without Bedrails 4   Moving Bed to Chair 4   Standing Up From Chair Using Arms 4   Walk in Room 4   Climb 3-5 Stairs With Railing 4   Basic Mobility Inpatient Raw Score 24    Basic Mobility Standardized Score 57.68   [facility] Highest Level Of Mobility   -HL Goal 8: Walk 250 feet or more   -HLM Achieved 7: Walk 25 feet or more   Tinetti   Sitting Balance 1   Arises 2   Attempts to Arise 2   Immediate Standing Balance (First 5 Seconds) 2   Standing Balance 2   Nudged 1   Eyes Closed 1   Turned 360 Degrees: Steadiness 1   Turned 360 Degrees: Continuity of Steps 1   Sitting Down 2   Balance Score 15   Initiation of Gait 1   Step Height: R Swing Foot 1   Step Length: R Swing Foot 1   Step Height: L Swing Foot 1   Step Length: L Swing Foot 1   Step Symmetry 1   Step Continuity 1   Path 2   Trunk 2   Walking Time 1   Gait Score 12   Total Score 27   End of Consult   Patient Position at End of Consult Seated edge of bed;All needs within reach     Qiana Swift, PT, DPT

## 2024-06-19 NOTE — PROGRESS NOTES
NEUROLOGY RESIDENCY PROGRESS NOTE     Name: Ame Bonilla   Age & Sex: 28 y.o. female   MRN: [id number]  Unit/Bed#: [facility] 709-01   Encounter: [id number]    Recommendations for outpatient neurological follow up have yet to be determined.     Pending for discharge: MRI Brain and C-spine with contrast to rule out demyelinating disease    ASSESSMENT & PLAN     * Stroke-like symptoms  Assessment & Plan  28-year-old female with no documented significant past medical history, who presented as a prehospital stroke alert on 6/18 with strokelike symptoms.  Patient reports that she went to bed the night prior with normal and woke up at approximately 4:15 AM on 6/18 with right facial/arm/leg numbness/tingling.  BP on presentation 137/86.  NIHSS 2 on admission.      Work up:  CT head without acute changes noted.  Due to contrast allergy, discussed with attending neurologist and decision was made to defer CTA H/N until patient was unable to undergo appropriate prep. patient was deemed not a TNK candidate due to being outside of the appropriate window/nondisabling symptoms.   Lipid Panel HDL 48 and the rest are within normal.  Hemoglobin A1c 5.6%  Unclear etiology at this time.   06/19/2024: Pt reports life stressors privately during rounds.    Plan:  Continue Stroke pathway  MRI brain and C-spine with contrast to rule out demyelinating disease  Echo ordered  S/p aspirin 325 mg x 1 dose   Aspirin 81 mg starting this AM  Atorvastatin 40 mg  Permissive HTN, labetalol if SBP >200  Continue telemetry  PT/OT/ST  Frequent neuro checks. Continue to monitor and notify neurology with any changes.             SUBJECTIVE     Patient was seen and examined. No acute events overnight.  Pt reports only mild tingling in the right hand. Denied tingling/numbness on the face and LE. Pt reports life stressors privately during rounds.    Pertinent Negatives include: numbness, weakness, speech or visual changes, headaches, migraine headaches,  syncope, seizures, paralysis/weakness, involuntary movements, tremor, speech impairment     Review of Systems   Constitutional:  Negative for chills and fever.   HENT:  Negative for congestion, ear pain and sore throat.    Eyes:  Negative for pain and visual disturbance.   Respiratory:  Negative for cough, choking, chest tightness and shortness of breath.    Cardiovascular:  Negative for chest pain, palpitations and leg swelling.   Gastrointestinal:  Negative for abdominal pain, diarrhea, nausea and vomiting.   Endocrine: Negative.    Genitourinary:  Negative for dysuria and hematuria.   Musculoskeletal:  Negative for arthralgias and back pain.   Skin:  Negative for color change and rash.   Allergic/Immunologic: Negative.    Neurological:  Negative for dizziness, seizures, syncope, weakness, light-headedness and headaches.        Mild tingling in the right hand.   Psychiatric/Behavioral:  Negative for agitation and confusion.    All other systems reviewed and are negative.      OBJECTIVE     Patient ID: Ame Bonilla is a 28 y.o. female.    Vitals:    24 2200 24 0250 24 0420 24 0759   BP: 110/76 101/64 111/67 121/67   BP Location:       Pulse: 76 75 70 78   Resp:    20   Temp:    98.2 °F (36.8 °C)   TempSrc:       SpO2: 97% 97% 97% 99%   Weight:          Temperature:   Temp (24hrs), Av.3 °F (36.8 °C), Min:98.2 °F (36.8 °C), Max:98.3 °F (36.8 °C)    Temperature: 98.2 °F (36.8 °C)      Physical Exam  Vitals and nursing note reviewed.   Constitutional:       General: She is not in acute distress.     Appearance: She is well-developed.   HENT:      Head: Normocephalic and atraumatic.      Mouth/Throat:      Mouth: Mucous membranes are moist.      Pharynx: Oropharynx is clear. No oropharyngeal exudate.   Eyes:      Extraocular Movements: Extraocular movements intact.      Conjunctiva/sclera: Conjunctivae normal.      Pupils: Pupils are equal, round, and reactive to light.   Cardiovascular:       Rate and Rhythm: Normal rate and regular rhythm.      Heart sounds: Normal heart sounds. No murmur heard.     No gallop.   Pulmonary:      Effort: Pulmonary effort is normal. No respiratory distress.      Breath sounds: Normal breath sounds. No wheezing or rales.   Abdominal:      General: There is no distension.      Palpations: Abdomen is soft.      Tenderness: There is no abdominal tenderness.   Musculoskeletal:         General: No swelling or tenderness.      Cervical back: Neck supple.      Right lower leg: No edema.      Left lower leg: No edema.   Skin:     General: Skin is warm and dry.      Capillary Refill: Capillary refill takes less than 2 seconds.      Coloration: Skin is not jaundiced or pale.   Neurological:      Mental Status: She is alert.      Cranial Nerves: Cranial nerves 2-12 are intact. No cranial nerve deficit.      Motor: Motor strength is normal.No weakness.      Gait: Gait normal.   Psychiatric:         Mood and Affect: Mood normal.          Neurologic Exam     Mental Status   Disoriented to person.   Disoriented to place. Disoriented to country and city.   Attention: normal.   Level of consciousness: alert  Knowledge: good.     Cranial Nerves   Cranial nerves II through XII intact.     CN III, IV, VI   Pupils are equal, round, and reactive to light.    Motor Exam   Overall muscle tone: normal  Right arm tone: normal  Left arm tone: normal  Right leg tone: normal  Left leg tone: normal    Strength   Strength 5/5 throughout.     Sensory Exam   Right arm light touch: normal  Left arm light touch: normal  Right leg light touch: normal  Left leg light touch: normal  Pt reports only mild tingling in the right hand.               LABORATORY DATA     Labs: I have personally reviewed pertinent reports.    Results from last 7 days   Lab Units 06/19/24  0443 06/18/24  0937   WBC Thousand/uL 9.11 8.01   HEMOGLOBIN g/dL 12.1 13.5   HEMATOCRIT % 36.0 40.1   PLATELETS Thousands/uL 324 258       Results from last 7 days   Lab Units 06/19/24  0443 06/18/24  0937   SODIUM mmol/L 138 137   POTASSIUM mmol/L 3.6 4.0   CHLORIDE mmol/L 104 105   CO2 mmol/L 25 25   BUN mg/dL 12 9   CREATININE mg/dL 0.56* 0.61   CALCIUM mg/dL 8.1* 8.9   ALK PHOS U/L 85  --    ALT U/L 12  --    AST U/L 11*  --               Results from last 7 days   Lab Units 06/18/24  0937   INR  1.00   PTT seconds 28               IMAGING & DIAGNOSTIC TESTING     Radiology Results: I have personally reviewed pertinent reports.      MRI brain wo contrast   Final Result by Allen Berry DO (06/19 0142)      No acute process seen. No discrete evidence of acute or subacute infarct.      Pineal cyst, and other findings as above.      Workstation performed: [id number]         CTA stroke alert (head/neck)   Final Result by Jennifer Hdez MD (06/18 7835)      CT Brain:      1.  No acute intracranial abnormality. No significant interval change.   2.  Partially empty sella which can be seen in intracranial hypertension in the right clinical setting.   3.  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 Recommend follow-up with brain MRI without and with contrast to complete characterization.      CT Angiography: Unremarkable CTA neck and brain.                     Findings were directly discussed with Mikey Shukla  at 3:30 pm                           Workstation performed: [id number]         CT stroke alert brain   Final Result by Jennifer Hdez MD (06/18 0947)      1.  No acute intracranial CT abnormality.   2.  Partially empty sella which can be seen in intracranial hypertension in the right clinical setting.   3.  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 Recommend follow-up brain MRI without and with contrast for complete characterization.                  Findings were directly discussed with Johnny Horn at approximately 9:42 am.      Workstation performed: [id number]         MRI brain MS wo and w contrast    (Results Pending)   MRI cervical spine w wo contrast    (Results Pending)        Other Diagnostic Testing: I have personally reviewed pertinent reports.      ACTIVE MEDICATIONS     Current Facility-Administered Medications   Medication Dose Route Frequency    aspirin (ECOTRIN LOW STRENGTH) EC tablet 81 mg  81 mg Oral Daily    atorvastatin (LIPITOR) tablet 40 mg  40 mg Oral QPM       Prior to Admission medications    Medication Sig Start Date End Date Taking? Authorizing Provider   naproxen (Naprosyn) 500 mg tablet Take 1 tablet (500 mg total) by mouth 3 (three) times a day as needed for moderate pain  Patient not taking: Reported on 5/20/2023 2/18/22   BRAXTON Yousif         VTE Pharmacologic Prophylaxis: VTE covered by:    None    VTE score zero  ======    I have discussed the patient's history, physical exam findings, assessment, and plan in detail with attending, Dr. Shukla.    Thank you for allowing me to participate in the care of your patient, Ame Bonilla.    Sharita Turner MD  [facility] Neurology Residency, PGY-3.

## 2024-06-19 NOTE — OCCUPATIONAL THERAPY NOTE
Occupational Therapy Evaluation     Patient Name: Ame Bonilla  Today's Date: 2024  Problem List  Principal Problem:    Stroke-like symptoms    Past Medical History  Past Medical History:   Diagnosis Date    Varicella     as child      Past Surgical History  Past Surgical History:   Procedure Laterality Date     SECTION      TUBAL LIGATION             24 0849   OT Last Visit   OT Visit Date 24   Note Type   Note type Evaluation   Pain Assessment   Pain Assessment Tool 0-10   Pain Score No Pain   Restrictions/Precautions   Weight Bearing Precautions Per Order No   Other Precautions Multiple lines   Home Living   Type of Home House   Home Layout Multi-level;Bed/bath upstairs;Stairs to enter with rails   Bathroom Shower/Tub Tub/shower unit   Bathroom Toilet Standard   Bathroom Accessibility Accessible   Prior Function   Level of [place] Independent with ADLs;Independent with functional mobility;Independent with IADLS   Lives With Family;Significant other  (Pt reports she lives with her boyfriend and her three kids)   Receives Help From Family  (Pt reports she can receive help from boyfriend as well)   IADLs Independent with driving;Independent with meal prep;Independent with medication management   Falls in the last 6 months 0   Vocational Full time employment   Lifestyle   [place] Independent with ADL's/IADL's and functional mobility   Reciprocal Relationships Supportive family   Service to Others Full time banker   Intrinsic Gratification Being with her children   ADL   Eating Assistance 7  Independent   Grooming Assistance 7  Independent   UB Bathing Assistance 5  Supervision/Setup   LB Bathing Assistance 5  Supervision/Setup   UB Dressing Assistance 5  Supervision/Setup   LB Dressing Assistance 5  Supervision/Setup   Toileting Assistance  5  Supervision/Setup   Bed Mobility   Supine to Sit 5  Supervision   Sit to Supine 5  Supervision   Transfers   Sit to Stand 5  Supervision    Stand to Sit 5  Supervision   Functional Mobility   Functional Mobility 5  Supervision   Additional Comments Pt performed household distance without the use of AD and no overt LOB, good activity tolerance   Balance   Static Sitting Good   Dynamic Sitting Good   Static Standing Fair   Dynamic Standing Fair   Ambulatory Fair +   Activity Tolerance   Activity Tolerance Patient tolerated treatment well   Medical Staff Made Aware OT Dede and PT Qiana   Nurse Made Aware Yes   RUE Assessment   RUE Assessment X  (Pt had R weak  strength)   LUE Assessment   LUE Assessment WFL   Hand Function   Gross Motor Coordination Functional   Fine Motor Coordination Functional   Psychosocial   Psychosocial (WDL) WDL   Cognition   Overall Cognitive Status WFL   Arousal/Participation Alert;Cooperative;Arousable   Attention Within functional limits   Orientation Level Oriented X4   Memory Within functional limits   Following Commands Follows all commands and directions without difficulty   Comments Pt was pleasant to work with and cooperative   Assessment   Prognosis Good   Assessment Pt is a 28 y.o. female who was admitted to [facility] on 6/18/2024 with Stroke-like symptoms . Pt presented with numbness on the right side of face, arm, and leg. 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 Patient has a past medical history of varicella. At baseline pt was completing all ADL's/IADL's and functional mobility independently. Pt lives in a multi-level house with stairs to enter and completes all ADL's on the second floor with significant other and children as support. Pt currently presents with impairments in the following categories -health management  UE strength and (R) attention. 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 OT will  continue to follow to address the below stated goals.   Goals   Patient Goals To go home   LTG Time Frame 7-10   Long Term Goal #1 Pt will demonstrate good carryover of UE strengthening techniques due to weakness for IADL I.   Plan   Treatment Interventions UE strengthening/ROM;Activityengagement   Goal Expiration Date 07/03/24   OT Frequency 1-2x/wk   Discharge Recommendation   Rehab Resource Intensity Level, OT III (Minimum Resource Intensity)   AM-PAC Daily Activity Inpatient   Lower Body Dressing 3   Bathing 3   Toileting 3   Upper Body Dressing 4   Grooming 4   Eating 4   Daily Activity Raw Score 21   Daily Activity Standardized Score (Calc for Raw Score >=11) 44.27   AM-PAC Applied Cognition Inpatient   Following a Speech/Presentation 3   Understanding Ordinary Conversation 4   Taking Medications 4   Remembering Where Things Are Placed or Put Away 4   Remembering List of 4-5 Errands 4   Taking Care of Complicated Tasks 4   Applied Cognition Raw Score 23   Applied Cognition Standardized Score 53.08   Additional Treatment Session   Start Time 0856   End Time 0907   Treatment Assessment Pt received OT treatment for R UE weakness and  strength. Pt was given a home exercise occupational therapy sheet as well as educated on the use of therapeutic putty for the weak  strength of the R UE. Pt verbalized understanding of how to use the putty for strengthening. Pt was demonstrated different techniques on how to use the putty in the R UE. Pt presents with no skilled OT needs and can be d/c from caseload, pt reports supportive family and significant other to assist as needed.   End of Consult   Education Provided Yes  (Pt was educated on using theraputty)   Patient Position at End of Consult Supine;All needs within reach   Nurse Communication Nurse aware of consult       ALISON Grissom

## 2024-06-19 NOTE — INCIDENTAL FINDINGS
The following findings require follow up:  Radiographic finding   Findin.2 cm pineal cyst    Follow up required: yes, MRI Brain   Follow up should be done within 6 month(s)    Please notify the following clinician to assist with the follow up:  Primary Care: Dr. Aretha Fish MD  Or BRAXTON Yousif

## 2024-06-19 NOTE — PLAN OF CARE
Problem: SAFETY ADULT  Goal: Patient will remain free of falls  Description: INTERVENTIONS:  - Educate patient/family on patient safety including physical limitations  - Instruct patient to call for assistance with activity   - Consult OT/PT to assist with strengthening/mobility   - Keep Call bell within reach  - Keep bed low and locked with side rails adjusted as appropriate  - Keep care items and personal belongings within reach  - Initiate and maintain comfort rounds  - Make Fall Risk Sign visible to staff  - Offer Toileting every 2 Hours, in advance of need  - Initiate/Maintain bed/chair alarm  - Obtain necessary fall risk management equipment  - Apply yellow socks and bracelet for high fall risk patients  - Consider moving patient to room near nurses station  Outcome: Progressing     Problem: PAIN - ADULT  Goal: Verbalizes/displays adequate comfort level or baseline comfort level  Description: Interventions:  - Encourage patient to monitor pain and request assistance  - Assess pain using appropriate pain scale  - Administer analgesics based on type and severity of pain and evaluate response  - Implement non-pharmacological measures as appropriate and evaluate response  - Consider cultural and social influences on pain and pain management  - Notify physician/advanced practitioner if interventions unsuccessful or patient reports new pain  Outcome: Progressing     Problem: NEUROSENSORY - ADULT  Goal: Achieves stable or improved neurological status  Description: INTERVENTIONS  - Monitor and report changes in neurological status  - Monitor vital signs such as temperature, blood pressure, glucose, and any other labs ordered   - Initiate measures to prevent increased intracranial pressure  - Monitor for seizure activity and implement precautions if appropriate      Outcome: Progressing     Problem: NEUROSENSORY - ADULT  Goal: Achieves maximal functionality and self care  Description: INTERVENTIONS  - Monitor  swallowing and airway patency with patient fatigue and changes in neurological status  - Encourage and assist patient to increase activity and self care.   - Encourage visually impaired, hearing impaired and aphasic patients to use assistive/communication devices  Outcome: Progressing     Problem: CARDIOVASCULAR - ADULT  Goal: Maintains optimal cardiac output and hemodynamic stability  Description: INTERVENTIONS:  - Monitor I/O, vital signs and rhythm  - Monitor for S/S and trends of decreased cardiac output  - Administer and titrate ordered vasoactive medications to optimize hemodynamic stability  - Assess quality of pulses, skin color and temperature  - Assess for signs of decreased coronary artery perfusion  - Instruct patient to report change in severity of symptoms  Outcome: Progressing     Problem: CARDIOVASCULAR - ADULT  Goal: Absence of cardiac dysrhythmias or at baseline rhythm  Description: INTERVENTIONS:  - Continuous cardiac monitoring, vital signs, obtain 12 lead EKG if ordered  - Administer antiarrhythmic and heart rate control medications as ordered  - Monitor electrolytes and administer replacement therapy as ordered  Outcome: Progressing     Problem: MUSCULOSKELETAL - ADULT  Goal: Maintain or return mobility to safest level of function  Description: INTERVENTIONS:  - Assess patient's ability to carry out ADLs; assess patient's baseline for ADL function and identify physical deficits which impact ability to perform ADLs (bathing, care of mouth/teeth, toileting, grooming, dressing, etc.)  - Assess/evaluate cause of self-care deficits   - Assess range of motion  - Assess patient's mobility  - Assess patient's need for assistive devices and provide as appropriate  - Encourage maximum independence but intervene and supervise when necessary  - Involve family in performance of ADLs  - Assess for home care needs following discharge   - Consider OT consult to assist with ADL evaluation and planning for  discharge  - Provide patient education as appropriate  Outcome: Progressing     Problem: MUSCULOSKELETAL - ADULT  Goal: Maintain proper alignment of affected body part  Description: INTERVENTIONS:  - Support, maintain and protect limb and body alignment  - Provide patient/ family with appropriate education  Outcome: Progressing     Problem: METABOLIC, FLUID AND ELECTROLYTES - ADULT  Goal: Electrolytes maintained within normal limits  Description: INTERVENTIONS:  - Monitor labs and assess patient for signs and symptoms of electrolyte imbalances  - Administer electrolyte replacement as ordered  - Monitor response to electrolyte replacements, including repeat lab results as appropriate  - Instruct patient on fluid and nutrition as appropriate  Outcome: Progressing     Problem: Knowledge Deficit  Goal: Patient/family/caregiver demonstrates understanding of disease process, treatment plan, medications, and discharge instructions  Description: Complete learning assessment and assess knowledge base.  Interventions:  - Provide teaching at level of understanding  - Provide teaching via preferred learning methods  Outcome: Progressing

## 2024-06-19 NOTE — PLAN OF CARE
Problem: OCCUPATIONAL THERAPY ADULT  Goal: Performs self-care activities at highest level of function for planned discharge setting.  See evaluation for individualized goals.  Description: Treatment Interventions: UE strengthening/ROM, Activityengagement          See flowsheet documentation for full assessment, interventions and recommendations.   6/19/2024 1516 by Aida Manriquez  Note:    Prognosis: Good  Assessment: Pt is a 28 y.o. female who was admitted to [facility] on 6/18/2024 with Stroke-like symptoms . Pt presented with numbness on the right side of face, arm, and leg. 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 Patient has a past medical history of varicella. At baseline pt was completing all ADL's/IADL's and functional mobility independently. Pt lives in a multi-level house with stairs to enter and completes all ADL's on the second floor with significant other and children as support. Pt currently presents with impairments in the following categories -health management  UE strength and (R) attention. 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 OT will continue to follow to address the below stated goals.     Rehab Resource Intensity Level, OT: III (Minimum Resource Intensity)       6/19/2024 1516 by Aida Manriquez  Note:    Prognosis: Good  Assessment: Pt is a 28 y.o. female who was admitted to [facility] on 6/18/2024 with Stroke-like symptoms . Pt presented with numbness on the right side of face, arm, and leg. 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 Patient has a past medical history of varicella. At baseline pt was completing all ADL's/IADL's and functional mobility independently. Pt lives in a multi-level house with stairs to enter and completes all ADL's on the second floor with significant  other and children as support. Pt currently presents with impairments in the following categories -health management  UE strength and (R) attention. 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 OT will continue to follow to address the below stated goals.     Rehab Resource Intensity Level, OT: III (Minimum Resource Intensity)

## 2024-06-19 NOTE — SPEECH THERAPY NOTE
Speech Language/Pathology  Speech/Language Pathology  Assessment    Patient Name: Ame Bonilla  Today's Date: 2024     Problem List  Active Problems:    Stroke-like symptoms    Past Medical History  Past Medical History:   Diagnosis Date    Varicella     as child      Past Surgical History  Past Surgical History:   Procedure Laterality Date     SECTION      TUBAL LIGATION         Pt screened at lunch.  No ST needs identified. Will d/c order. Please reconsult of specific ST needs arise.    H&P/Admit info/ pertinent provider notes: (PMH noted above)  HPI: Ame Bonilla is a 28 y.o. right handed female with no documented significant past medical history, who presents with stroke-like symptoms.  Patient reports she went to bed normal last night around 9 pm. She woke up around 4:15 am and noticed R facial/arm/leg numbness/tingling. She denies any numbness/tingling in her R torso. Her symptoms continue to persist. Pre-hospital stroke alert was initiated. Patient denies any weakness, headaches, neck pain, back pain, vision changes, dizziness/lightheadedness, speech difficulty. She reports she was able to ambulate but her R leg felt numb when she did. She is not on AC/AP at baseline. She does endorse increased personal stressors at home. She has not had these symptoms before.    Special Studies:  MRI 24  No acute process seen. No discrete evidence of acute or subacute infarct.  Pineal cyst, and other findings as above.

## 2024-06-19 NOTE — UTILIZATION REVIEW
Initial Clinical Review    Admission: Date/Time/Statement:   Admission Orders (From admission, onward)       Ordered        06/18/24 1514  INPATIENT ADMISSION  Once                          Orders Placed This Encounter   Procedures    INPATIENT ADMISSION     Standing Status:   Standing     Number of Occurrences:   1     Order Specific Question:   Level of Care     Answer:   Med Surg [16]     Order Specific Question:   Estimated length of stay     Answer:   More than 2 Midnights     Order Specific Question:   Certification     Answer:   I certify that inpatient services are medically necessary for this patient for a duration of greater than two midnights. See H&P and MD Progress Notes for additional information about the patient's course of treatment.     ED Arrival Information       Expected   6/18/2024 09:00    Arrival   6/18/2024 09:26    Acuity   Emergent              Means of arrival   Ambulance    Escorted by   [facility]    Service   Neurology    Admission type   Emergency              Arrival complaint   numbness and tingling in face             Chief Complaint   Patient presents with    STROKE Alert       Initial Presentation: 28 y.o. female no significant medical history to ED by EMS as Inpatient admission due to stroke like symptoms.  Reports she went to bed, woke at 4:15 AM with R sided facial, R LUE/RLE  numbness /tingling,     EXAM  Right-sided facial numbness noted in the V2 and V3 distributions. Right arm sensory deficits appreciated. Right lower extremity sensory deficits and numbness appreciated to the knee however below the knee patient's endorsed hyperesthesia.   NIHSS 2, CTH wo acute findings, did not receive TNK: Thrombolytic Decision: Patient not a candidate. Unclear time of onset outside appropriate time window. and Symptoms resolved/clearly non disabling.    PLAN  oading with aspirin 325, starting atorvastatin 40 mg daily, neuro checks, MRI Brain, echo, tele, DC dispo  darius  Anticipated Length of Stay/Certification Statement: Patient will be admitted on an Inpatient basis with an anticipated length of stay of  > 2 midnights.   Date: 6/19/2024   Day 2:   Pending for DC MRI Brain and C-spine with contrast to rule out demyelinating disease   Due to contrast allergy, discussed with attending neurologist and decision was made to defer CTA H/N until patient was unable to undergo appropriate prep   reports only mild tingling in the right hand. Denied tingling/numbness on the face and LE. Pt reports life stressors privately during rounds   Cont Aspirin 81 mg starting this AM, Atorvastatin 40 mg, Permissive HTN, labetalol if SBP >200, cont tele, DC Dispo evaluations  Recommendations for outpatient neurological follow up have yet to be determined.     ED Triage Vitals   Temperature Pulse Respirations Blood Pressure SpO2 Pain Score   06/18/24 0948 06/18/24 0930 06/18/24 0930 06/18/24 0930 06/18/24 0930 06/18/24 2000   98.6 °F (37 °C) 91 16 137/86 100 % No Pain     Weight (last 2 days)       Date/Time Weight    06/19/24 12:16:47 71.2 (157)    06/18/24 0929 71.5 (157.52)            Vital Signs (last 3 days)       Date/Time Temp Pulse Resp BP MAP (mmHg) SpO2 O2 Device Patient Position - Orthostatic VS [place] Coma Scale Score Pain    06/19/24 12:16:47 99 °F (37.2 °C) 85 20 110/70 83 97 % -- -- -- --    06/19/24 0849 -- -- -- -- -- -- -- -- -- No Pain    06/19/24 07:59:37 98.2 °F (36.8 °C) 78 20 121/67 85 99 % -- -- -- --    06/19/24 0730 -- -- -- -- -- -- -- -- 15 No Pain    06/19/24 0420 -- 70 -- 111/67 82 97 % -- -- -- --    06/19/24 0400 -- -- -- -- -- -- -- -- 15 --    06/19/24 0250 -- 75 -- 101/64 76 97 % -- -- -- --    06/19/24 0200 -- -- -- -- -- -- -- -- 15 --    06/19/24 0000 -- -- -- -- -- -- -- -- 15 --    06/18/24 2200 -- 76 -- 110/76 87 97 % -- -- 15 --    06/18/24 21:51:11 98.3 °F (36.8 °C) 87 -- 110/76 87 98 % -- -- -- --    06/18/24 2000 -- -- -- -- -- -- None (Room air) -- 15  No Pain    06/18/24 18:19:45 98.3 °F (36.8 °C) 81 18 121/67 85 99 % None (Room air) Lying -- --    06/18/24 1818 -- 76 -- 121/67 85 98 % -- -- -- --    06/18/24 1800 -- 86 14 110/67 -- 98 % None (Room air) -- 15 --    06/18/24 1700 -- 96 15 112/65 -- 96 % None (Room air) -- 15 --    06/18/24 1600 -- 106 18 115/59 -- 97 % None (Room air) Lying 15 --    06/18/24 1515 -- 96 17 110/64 -- 98 % None (Room air) -- 15 --    06/18/24 1500 -- 82 17 110/64 -- 97 % None (Room air) -- 15 --    06/18/24 1445 -- 84 15 112/67 -- 96 % -- -- 15 --    06/18/24 1430 -- 92 17 110/64 -- 97 % -- -- 15 --    06/18/24 1415 -- 84 16 109/68 -- 97 % -- -- 15 --    06/18/24 1400 -- 92 20 116/70 -- 98 % -- -- 15 --    06/18/24 1345 -- 92 16 126/83 -- 100 % None (Room air) -- 15 --    06/18/24 1330 -- 86 20 111/70 -- 97 % None (Room air) -- 15 --    06/18/24 1315 -- 80 16 115/70 -- 97 % None (Room air) -- 15 --    06/18/24 1300 -- 88 19 113/72 -- 99 % None (Room air) -- 15 --    06/18/24 1245 -- 78 17 109/70 -- 98 % None (Room air) -- 15 --    06/18/24 1230 -- 74 17 108/64 -- 98 % None (Room air) -- 15 --    06/18/24 1215 -- 72 19 110/68 -- 98 % None (Room air) -- 15 --    06/18/24 1200 -- 76 20 112/74 -- 98 % None (Room air) -- 15 --    06/18/24 1145 -- 76 14 110/70 -- 98 % None (Room air) -- 15 --    06/18/24 1130 -- 74 13 110/69 -- 99 % None (Room air) -- 15 --    06/18/24 1115 -- 65 14 108/62 -- 98 % None (Room air) -- 15 --    06/18/24 1100 -- 64 12 102/56 -- 99 % None (Room air) -- 15 --    06/18/24 1045 -- 68 12 104/58 -- 99 % None (Room air) -- 15 --    06/18/24 1030 -- 68 12 104/60 -- 99 % None (Room air) -- 15 --    06/18/24 1015 -- 76 15 110/66 -- 98 % None (Room air) -- 15 --    06/18/24 1000 -- 74 13 110/68 -- 99 % None (Room air) -- 15 --    06/18/24 0948 98.6 °F (37 °C) -- -- -- -- -- -- -- -- --    06/18/24 0945 -- 81 15 113/70 -- 100 % None (Room air) -- 15 --    06/18/24 09:30:34 -- 91 16 137/86 -- 100 % None (Room air) -- 15 --           GCS=  Trauma Secondary Assessment - Michael Coma Scale    Date and Time Eye Opening Best Verbal Response Best Motor Response Michael Coma Scale Score   06/19/24 0730 4 5 6 15   06/19/24 0400 4 5 6 15   06/19/24 0200 4 5 6 15   06/19/24 0000 4 5 6 15   06/18/24 2200 4 5 6 15   06/18/24 2000 4 5 6 15   06/18/24 1800 4 5 6 15   06/18/24 1700 4 5 6 15   06/18/24 1600 4 5 6 15   06/18/24 1515 4 5 6 15   06/18/24 1500 4 5 6 15   06/18/24 1445 4 5 6 15   06/18/24 1430 4 5 6 15   06/18/24 1415 4 5 6 15   06/18/24 1400 4 5 6 15   06/18/24 1345 4 5 6 15   06/18/24 1330 4 5 6 15   06/18/24 1315 4 5 6 15   06/18/24 1300 4 5 6 15   06/18/24 1245 4 5 6 15   06/18/24 1230 4 5 6 15   06/18/24 1215 4 5 6 15   06/18/24 1200 4 5 6 15   06/18/24 1145 4 5 6 15   06/18/24 1130 4 5 6 15   06/18/24 1115 4 5 6 15   06/18/24 1100 4 5 6 15   06/18/24 1045 4 5 6 15   06/18/24 1030 4 5 6 15   06/18/24 1015 4 5 6 15   06/18/24 1000 4 5 6 15   06/18/24 0945 4 5 6 15   06/18/24 0930 4 5 6 15       Pertinent Labs/Diagnostic Test Results:   Radiology:  MRI cervical spine w wo contrast   Final Interpretation by Jennifer Hdez MD (06/19 7039)      1.  Technically suboptimal image quality. No definite cord signal abnormality or pathologic enhancement.   2.  No significant degenerative change.               Workstation performed: [id number]         MRI brain MS wo and w contrast   Final Interpretation by Jennifer Hedz MD (06/19 9182)      1.  No parenchymal signal abnormality or abnormal enhancement.   2.  Partially empty sella and narrowing of distal bilateral transverse dural venous sinuses. Findings can be seen in patients with intracranial hypertension in the right clinical setting.   3.  1.2 cm pineal cyst      Workstation performed: [id number]         MRI brain wo contrast   Final Interpretation by Allen Berry DO (06/19 0142)      No acute process seen. No discrete evidence of acute or subacute infarct.      Pineal cyst, and  other findings as above.      Workstation performed: [id number]         CTA stroke alert (head/neck)   Final Interpretation by Jennifer Hdez MD (06/18 9751)      CT Brain:      1.  No acute intracranial abnormality. No significant interval change.   2.  Partially empty sella which can be seen in intracranial hypertension in the right clinical setting.   3.  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 Recommend follow-up with brain MRI without and with contrast to complete characterization.      CT Angiography: Unremarkable CTA neck and brain.                     Findings were directly discussed with Mikey Shukla  at 3:30 pm                           Workstation performed: [id number]         CT stroke alert brain   Final Interpretation by Jennifer Hdez MD (06/18 0985)      1.  No acute intracranial CT abnormality.   2.  Partially empty sella which can be seen in intracranial hypertension in the right clinical setting.   3.  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 Recommend follow-up brain MRI without and with contrast for complete characterization.                  Findings were directly discussed with Johnny Horn at approximately 9:42 am.      Workstation performed: [id number]           Cardiology:  Echo complete w/ contrast if indicated   Final Result by Dat Benson MD (06/19 1315)        Left Ventricle: Left ventricular cavity size is normal. Wall thickness    is normal. The left ventricular ejection fraction is 65%. Systolic    function is normal. Wall motion is normal. Diastolic function is normal.     Mitral Valve: There is trace regurgitation.         ECG 12 lead   Final Result by Jerry Dueñas MD (06/18 1574)   *Age and gender specific ECG analysis *   Normal sinus rhythm   Low voltage QRS   Borderline ECG   When compared with ECG of 15-MAR-2011 15:29,   No significant change was found   Confirmed by Jerry Dueñas ([id number]) on 6/18/2024 6:25:29 PM        GI:  No orders to display       Results from last 7 days   Lab Units 06/18/24  3298  "  SARS-COV-2  Negative     Results from last 7 days   Lab Units 06/19/24  0443 06/18/24  0937   WBC Thousand/uL 9.11 8.01   HEMOGLOBIN g/dL 12.1 13.5   HEMATOCRIT % 36.0 40.1   PLATELETS Thousands/uL 324 258         Results from last 7 days   Lab Units 06/19/24  0443 06/18/24  0937   SODIUM mmol/L 138 137   POTASSIUM mmol/L 3.6 4.0   CHLORIDE mmol/L 104 105   CO2 mmol/L 25 25   ANION GAP mmol/L 9 7   BUN mg/dL 12 9   CREATININE mg/dL 0.56* 0.61   EGFR ml/min/1.73sq m 125 122   CALCIUM mg/dL 8.1* 8.9     Results from last 7 days   Lab Units 06/19/24  0443   AST U/L 11*   ALT U/L 12   ALK PHOS U/L 85   TOTAL PROTEIN g/dL 6.2*   ALBUMIN g/dL 3.6   TOTAL BILIRUBIN mg/dL 0.25     Results from last 7 days   Lab Units 06/18/24  0946 06/18/24  0845   POC GLUCOSE mg/dl 95 103     Results from last 7 days   Lab Units 06/19/24  0443 06/18/24  0937   GLUCOSE RANDOM mg/dL 104 101         Results from last 7 days   Lab Units 06/19/24  0443   HEMOGLOBIN A1C % 5.6   EAG mg/dl 114     No results found for: \"BETA-HYDROXYBUTYRATE\"                   Results from last 7 days   Lab Units 06/18/24  1118 06/18/24  0937   HS TNI 0HR ng/L  --  3   HS TNI 2HR ng/L 3  --    HSTNI D2 ng/L 0  --          Results from last 7 days   Lab Units 06/18/24  0937   PROTIME seconds 13.1   INR  1.00   PTT seconds 28                   Results from last 7 days   Lab Units 06/18/24  0937   INFLUENZA A PCR  Negative   INFLUENZA B PCR  Negative   RSV PCR  Negative         Results from last 7 days   Lab Units 06/18/24  1320   AMPH/METH  Negative   BARBITURATE UR  Negative   BENZODIAZEPINE UR  Negative   COCAINE UR  Negative   METHADONE URINE  Negative   OPIATE UR  Negative   PCP UR  Negative   THC UR  Negative           ED Treatment-Medication Administration from 06/18/2024 0900 to 06/18/2024 1814         Date/Time Order Dose Route Action     06/18/2024 0941 hydrocortisone (Solu-CORTEF) injection 200 mg 200 mg Intravenous Given     06/18/2024 1341 " diphenhydrAMINE (BENADRYL) injection 50 mg 50 mg Intravenous Given     06/18/2024 1030 aspirin tablet 325 mg 325 mg Oral Given     06/18/2024 1526 iohexol (OMNIPAQUE) 350 MG/ML injection (SINGLE-DOSE) 80 mL 80 mL Intravenous Given            Past Medical History:   Diagnosis Date    Varicella     as child      Present on Admission:  **None**      Admitting Diagnosis: Facial numbness [R20.0]  Stroke-like symptoms [R29.90]  Cerebrovascular accident (CVA), unspecified mechanism (HCC) [I63.9]  Age/Sex: 28 y.o. female  Admission Orders:  Telemetry  Continuous pulse oximetry  Neuro checks freq  Vitals freq  Nursing dysphagia assessment prior to diet  NPO  Assess NIH stroke scale on admission & DC  PT/OT/SPEECH assess & treat  MRI brain  CTH  scan w acute neuro change    Scheduled Medications:  aspirin, 81 mg, Oral, Daily  atorvastatin, 40 mg, Oral, QPM      Continuous IV Infusions:     PRN Meds:  perflutren lipid microsphere, 0.8 mL/min, Intravenous, Once in imaging        IP CONSULT TO NEUROLOGY  IP CONSULT TO PHYSICAL MEDICINE REHAB  IP CONSULT TO CASE MANAGEMENT  IP CONSULT TO NUTRITION SERVICES    Network Utilization Review Department  ATTENTION: Please call with any questions or concerns to [phone] and carefully listen to the prompts so that you are directed to the right person. All voicemails are confidential.   For Discharge needs, contact Care Management DC Support Team at [phone] opt. 2  Send all requests for admission clinical reviews, approved or denied determinations and any other requests to dedicated fax number below belonging to the campus where the patient is receiving treatment. List of dedicated fax numbers for the Facilities:  FACILITY NAME UR FAX NUMBER   ADMISSION DENIALS (Administrative/Medical Necessity) [phone]   DISCHARGE SUPPORT TEAM (NETWORK) [phone]   PARENT CHILD HEALTH (Maternity/NICU/Pediatrics) [phone]   [facility] [phone]   [facility]  [facility] [phone]   [facility] [phone]   [facility] [phone]   [facility] [phone]   [facility] [phone]   [facility] [phone]   [facility] [phone]   [facility] [phone]   [facility] [phone]   [facility] [phone]   [facility] [phone]

## 2024-06-19 NOTE — UTILIZATION REVIEW
NOTIFICATION OF INPATIENT ADMISSION      AUTHORIZATION REQUEST   SERVICING FACILITY:   [facility]  Address: [address] [place] [address]  Tax ID: [id number]  NPI: [id number] ATTENDING PROVIDER:  Attending Name and NPI#: Mikey Shukla Md [[id number]]  Address: [address] [place] [address]  Phone: [phone]   ADMISSION INFORMATION:  Place of Service: Inpatient [facility]  Place of Service Code: 21  Inpatient Admission Date/Time: 6/18/24  3:14 PM  Discharge Date/Time: No discharge date for patient encounter.  Admitting Diagnosis Code/Description:  Facial numbness [R20.0]  Stroke-like symptoms [R29.90]  Cerebrovascular accident (CVA), unspecified mechanism (HCC) [I63.9]     UTILIZATION REVIEW CONTACT:  Olga Lidia Ndiaye Utilization   Network Utilization Review Department  Phone: [phone]  Fax: [phone]  Email: [email or web address]  Contact for approvals/pending authorizations, clinical reviews, and discharge.     PHYSICIAN ADVISORY SERVICES:  Medical Necessity Denial & Xorn-ot-Tzcw Review  Phone: [phone]  Fax: [phone]  Email: [email or web address]     DISCHARGE SUPPORT TEAM:  For Patients Discharge Needs & Updates  Phone: [phone] opt. 2 Fax: [phone]  Email: [email or web address]

## 2024-06-19 NOTE — PLAN OF CARE
Problem: PAIN - ADULT  Goal: Verbalizes/displays adequate comfort level or baseline comfort level  Description: Interventions:  - Encourage patient to monitor pain and request assistance  - Assess pain using appropriate pain scale  - Administer analgesics based on type and severity of pain and evaluate response  - Implement non-pharmacological measures as appropriate and evaluate response  - Consider cultural and social influences on pain and pain management  - Notify physician/advanced practitioner if interventions unsuccessful or patient reports new pain  Outcome: Progressing     Problem: INFECTION - ADULT  Goal: Absence or prevention of progression during hospitalization  Description: INTERVENTIONS:  - Assess and monitor for signs and symptoms of infection  - Monitor lab/diagnostic results  - Monitor all insertion sites, i.e. indwelling lines, tubes, and drains  - Monitor endotracheal if appropriate and nasal secretions for changes in amount and color  - [place] appropriate cooling/warming therapies per order  - Administer medications as ordered  - Instruct and encourage patient and family to use good hand hygiene technique  - Identify and instruct in appropriate isolation precautions for identified infection/condition  Outcome: Progressing  Goal: Absence of fever/infection during neutropenic period  Description: INTERVENTIONS:  - Monitor WBC    Outcome: Progressing

## 2024-06-20 VITALS
DIASTOLIC BLOOD PRESSURE: 64 MMHG | WEIGHT: 157 LBS | SYSTOLIC BLOOD PRESSURE: 98 MMHG | HEIGHT: 60 IN | TEMPERATURE: 99.6 F | RESPIRATION RATE: 16 BRPM | BODY MASS INDEX: 30.82 KG/M2 | HEART RATE: 88 BPM | OXYGEN SATURATION: 98 %

## 2024-06-20 PROBLEM — E34.8 PINEAL GLAND CYST: Status: ACTIVE | Noted: 2024-06-20

## 2024-06-20 PROCEDURE — 99238 HOSP IP/OBS DSCHRG MGMT 30/<: CPT | Performed by: PSYCHIATRY & NEUROLOGY

## 2024-06-20 RX ORDER — ATORVASTATIN CALCIUM 40 MG/1
40 TABLET, FILM COATED ORAL EVERY EVENING
Qty: 30 TABLET | Refills: 0 | Status: SHIPPED | OUTPATIENT
Start: 2024-06-20 | End: 2024-06-24

## 2024-06-20 RX ADMIN — ASPIRIN 81 MG: 81 TABLET, COATED ORAL at 07:56

## 2024-06-20 NOTE — PLAN OF CARE
Problem: PAIN - ADULT  Goal: Verbalizes/displays adequate comfort level or baseline comfort level  Description: Interventions:  - Encourage patient to monitor pain and request assistance  - Assess pain using appropriate pain scale  - Administer analgesics based on type and severity of pain and evaluate response  - Implement non-pharmacological measures as appropriate and evaluate response  - Consider cultural and social influences on pain and pain management  - Notify physician/advanced practitioner if interventions unsuccessful or patient reports new pain  Outcome: Progressing     Problem: INFECTION - ADULT  Goal: Absence or prevention of progression during hospitalization  Description: INTERVENTIONS:  - Assess and monitor for signs and symptoms of infection  - Monitor lab/diagnostic results  - Monitor all insertion sites, i.e. indwelling lines, tubes, and drains  - Monitor endotracheal if appropriate and nasal secretions for changes in amount and color  - [place] appropriate cooling/warming therapies per order  - Administer medications as ordered  - Instruct and encourage patient and family to use good hand hygiene technique  - Identify and instruct in appropriate isolation precautions for identified infection/condition  Outcome: Progressing  Goal: Absence of fever/infection during neutropenic period  Description: INTERVENTIONS:  - Monitor WBC    Outcome: Progressing     Problem: SAFETY ADULT  Goal: Patient will remain free of falls  Description: INTERVENTIONS:  - Educate patient/family on patient safety including physical limitations  - Instruct patient to call for assistance with activity   - Consult OT/PT to assist with strengthening/mobility   - Keep Call bell within reach  - Keep bed low and locked with side rails adjusted as appropriate  - Keep care items and personal belongings within reach  - Initiate and maintain comfort rounds  - Make Fall Risk Sign visible to staff  - Offer Toileting every 2 Hours,  in advance of need  - Initiate/Maintain bed/chair alarm  - Obtain necessary fall risk management equipment  - Apply yellow socks and bracelet for high fall risk patients  - Consider moving patient to room near nurses station  Outcome: Progressing  Goal: Maintain or return to baseline ADL function  Description: INTERVENTIONS:  -  Assess patient's ability to carry out ADLs; assess patient's baseline for ADL function and identify physical deficits which impact ability to perform ADLs (bathing, care of mouth/teeth, toileting, grooming, dressing, etc.)  - Assess/evaluate cause of self-care deficits   - Assess range of motion  - Assess patient's mobility; develop plan if impaired  - Assess patient's need for assistive devices and provide as appropriate  - Encourage maximum independence but intervene and supervise when necessary  - Involve family in performance of ADLs  - Assess for home care needs following discharge   - Consider OT consult to assist with ADL evaluation and planning for discharge  - Provide patient education as appropriate  Outcome: Progressing  Goal: Maintains/Returns to pre admission functional level  Description: INTERVENTIONS:  - Perform AM-PAC 6 Click Basic Mobility/ Daily Activity assessment daily.  - Set and communicate daily mobility goal to care team and patient/family/caregiver.   - Collaborate with rehabilitation services on mobility goals if consulted  - Perform Range of Motion 3 times a day.  - Reposition patient every 3 hours.  - Dangle patient 3 times a day  - Stand patient 3 times a day  - Ambulate patient 3 times a day  - Out of bed to chair 3 times a day   - Out of bed for meals 3 times a day  - Out of bed for toileting  - Record patient progress and toleration of activity level   Outcome: Progressing     Problem: DISCHARGE PLANNING  Goal: Discharge to home or other facility with appropriate resources  Description: INTERVENTIONS:  - Identify barriers to discharge w/patient and  caregiver  - Arrange for needed discharge resources and transportation as appropriate  - Identify discharge learning needs (meds, wound care, etc.)  - Arrange for interpretive services to assist at discharge as needed  - Refer to Case Management Department for coordinating discharge planning if the patient needs post-hospital services based on physician/advanced practitioner order or complex needs related to functional status, cognitive ability, or social support system  Outcome: Progressing     Problem: Knowledge Deficit  Goal: Patient/family/caregiver demonstrates understanding of disease process, treatment plan, medications, and discharge instructions  Description: Complete learning assessment and assess knowledge base.  Interventions:  - Provide teaching at level of understanding  - Provide teaching via preferred learning methods  Outcome: Progressing     Problem: NEUROSENSORY - ADULT  Goal: Achieves stable or improved neurological status  Description: INTERVENTIONS  - Monitor and report changes in neurological status  - Monitor vital signs such as temperature, blood pressure, glucose, and any other labs ordered   - Initiate measures to prevent increased intracranial pressure  - Monitor for seizure activity and implement precautions if appropriate      Outcome: Progressing  Goal: Remains free of injury related to seizures activity  Description: INTERVENTIONS  - Maintain airway, patient safety  and administer oxygen as ordered  - Monitor patient for seizure activity, document and report duration and description of seizure to physician/advanced practitioner  - If seizure occurs,  ensure patient safety during seizure  - Reorient patient post seizure  - Seizure pads on all 4 side rails  - Instruct patient/family to notify RN of any seizure activity including if an aura is experienced  - Instruct patient/family to call for assistance with activity based on nursing assessment  - Administer anti-seizure medications if  ordered    Outcome: Progressing  Goal: Achieves maximal functionality and self care  Description: INTERVENTIONS  - Monitor swallowing and airway patency with patient fatigue and changes in neurological status  - Encourage and assist patient to increase activity and self care.   - Encourage visually impaired, hearing impaired and aphasic patients to use assistive/communication devices  Outcome: Progressing     Problem: CARDIOVASCULAR - ADULT  Goal: Maintains optimal cardiac output and hemodynamic stability  Description: INTERVENTIONS:  - Monitor I/O, vital signs and rhythm  - Monitor for S/S and trends of decreased cardiac output  - Administer and titrate ordered vasoactive medications to optimize hemodynamic stability  - Assess quality of pulses, skin color and temperature  - Assess for signs of decreased coronary artery perfusion  - Instruct patient to report change in severity of symptoms  Outcome: Progressing  Goal: Absence of cardiac dysrhythmias or at baseline rhythm  Description: INTERVENTIONS:  - Continuous cardiac monitoring, vital signs, obtain 12 lead EKG if ordered  - Administer antiarrhythmic and heart rate control medications as ordered  - Monitor electrolytes and administer replacement therapy as ordered  Outcome: Progressing     Problem: METABOLIC, FLUID AND ELECTROLYTES - ADULT  Goal: Electrolytes maintained within normal limits  Description: INTERVENTIONS:  - Monitor labs and assess patient for signs and symptoms of electrolyte imbalances  - Administer electrolyte replacement as ordered  - Monitor response to electrolyte replacements, including repeat lab results as appropriate  - Instruct patient on fluid and nutrition as appropriate  Outcome: Progressing  Goal: Fluid balance maintained  Description: INTERVENTIONS:  - Monitor labs   - Monitor I/O and WT  - Instruct patient on fluid and nutrition as appropriate  - Assess for signs & symptoms of volume excess or deficit  Outcome: Progressing      Problem: MUSCULOSKELETAL - ADULT  Goal: Maintain or return mobility to safest level of function  Description: INTERVENTIONS:  - Assess patient's ability to carry out ADLs; assess patient's baseline for ADL function and identify physical deficits which impact ability to perform ADLs (bathing, care of mouth/teeth, toileting, grooming, dressing, etc.)  - Assess/evaluate cause of self-care deficits   - Assess range of motion  - Assess patient's mobility  - Assess patient's need for assistive devices and provide as appropriate  - Encourage maximum independence but intervene and supervise when necessary  - Involve family in performance of ADLs  - Assess for home care needs following discharge   - Consider OT consult to assist with ADL evaluation and planning for discharge  - Provide patient education as appropriate  Outcome: Progressing  Goal: Maintain proper alignment of affected body part  Description: INTERVENTIONS:  - Support, maintain and protect limb and body alignment  - Provide patient/ family with appropriate education  Outcome: Progressing

## 2024-06-20 NOTE — ASSESSMENT & PLAN NOTE
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 Recommend followup with outpatient Neurology for possible repeat MRI imaging for stability.

## 2024-06-20 NOTE — PLAN OF CARE
Problem: PAIN - ADULT  Goal: Verbalizes/displays adequate comfort level or baseline comfort level  Description: Interventions:  - Encourage patient to monitor pain and request assistance  - Assess pain using appropriate pain scale  - Administer analgesics based on type and severity of pain and evaluate response  - Implement non-pharmacological measures as appropriate and evaluate response  - Consider cultural and social influences on pain and pain management  - Notify physician/advanced practitioner if interventions unsuccessful or patient reports new pain  Outcome: Progressing     Problem: INFECTION - ADULT  Goal: Absence or prevention of progression during hospitalization  Description: INTERVENTIONS:  - Assess and monitor for signs and symptoms of infection  - Monitor lab/diagnostic results  - Monitor all insertion sites, i.e. indwelling lines, tubes, and drains  - Monitor endotracheal if appropriate and nasal secretions for changes in amount and color  - [place] appropriate cooling/warming therapies per order  - Administer medications as ordered  - Instruct and encourage patient and family to use good hand hygiene technique  - Identify and instruct in appropriate isolation precautions for identified infection/condition  Outcome: Progressing     Problem: SAFETY ADULT  Goal: Patient will remain free of falls  Description: INTERVENTIONS:  - Educate patient/family on patient safety including physical limitations  - Instruct patient to call for assistance with activity   - Consult OT/PT to assist with strengthening/mobility   - Keep Call bell within reach  - Keep bed low and locked with side rails adjusted as appropriate  - Keep care items and personal belongings within reach  - Initiate and maintain comfort rounds  - Make Fall Risk Sign visible to staff  - Offer Toileting every 2 Hours, in advance of need  - Initiate/Maintain bed/chair alarm  - Obtain necessary fall risk management equipment  - Apply yellow socks  and bracelet for high fall risk patients  - Consider moving patient to room near nurses station  Outcome: Progressing  Goal: Maintain or return to baseline ADL function  Description: INTERVENTIONS:  -  Assess patient's ability to carry out ADLs; assess patient's baseline for ADL function and identify physical deficits which impact ability to perform ADLs (bathing, care of mouth/teeth, toileting, grooming, dressing, etc.)  - Assess/evaluate cause of self-care deficits   - Assess range of motion  - Assess patient's mobility; develop plan if impaired  - Assess patient's need for assistive devices and provide as appropriate  - Encourage maximum independence but intervene and supervise when necessary  - Involve family in performance of ADLs  - Assess for home care needs following discharge   - Consider OT consult to assist with ADL evaluation and planning for discharge  - Provide patient education as appropriate  Outcome: Progressing  Goal: Maintains/Returns to pre admission functional level  Description: INTERVENTIONS:  - Perform AM-PAC 6 Click Basic Mobility/ Daily Activity assessment daily.  - Set and communicate daily mobility goal to care team and patient/family/caregiver.   - Collaborate with rehabilitation services on mobility goals if consulted  - Perform Range of Motion 3 times a day.  - Reposition patient every 2 hours.  - Dangle patient 3 times a day  - Stand patient 3 times a day  - Ambulate patient 3 times a day  - Out of bed to chair 3 times a day   - Out of bed for meals 3 times a day  - Out of bed for toileting  - Record patient progress and toleration of activity level   Outcome: Progressing     Problem: DISCHARGE PLANNING  Goal: Discharge to home or other facility with appropriate resources  Description: INTERVENTIONS:  - Identify barriers to discharge w/patient and caregiver  - Arrange for needed discharge resources and transportation as appropriate  - Identify discharge learning needs (meds, wound  care, etc.)  - Arrange for interpretive services to assist at discharge as needed  - Refer to Case Management Department for coordinating discharge planning if the patient needs post-hospital services based on physician/advanced practitioner order or complex needs related to functional status, cognitive ability, or social support system  Outcome: Progressing     Problem: Knowledge Deficit  Goal: Patient/family/caregiver demonstrates understanding of disease process, treatment plan, medications, and discharge instructions  Description: Complete learning assessment and assess knowledge base.  Interventions:  - Provide teaching at level of understanding  - Provide teaching via preferred learning methods  Outcome: Progressing     Problem: NEUROSENSORY - ADULT  Goal: Achieves stable or improved neurological status  Description: INTERVENTIONS  - Monitor and report changes in neurological status  - Monitor vital signs such as temperature, blood pressure, glucose, and any other labs ordered   - Initiate measures to prevent increased intracranial pressure  - Monitor for seizure activity and implement precautions if appropriate      Outcome: Progressing  Goal: Remains free of injury related to seizures activity  Description: INTERVENTIONS  - Maintain airway, patient safety  and administer oxygen as ordered  - Monitor patient for seizure activity, document and report duration and description of seizure to physician/advanced practitioner  - If seizure occurs,  ensure patient safety during seizure  - Reorient patient post seizure  - Seizure pads on all 4 side rails  - Instruct patient/family to notify RN of any seizure activity including if an aura is experienced  - Instruct patient/family to call for assistance with activity based on nursing assessment  - Administer anti-seizure medications if ordered    Outcome: Progressing  Goal: Achieves maximal functionality and self care  Description: INTERVENTIONS  - Monitor swallowing  and airway patency with patient fatigue and changes in neurological status  - Encourage and assist patient to increase activity and self care.   - Encourage visually impaired, hearing impaired and aphasic patients to use assistive/communication devices  Outcome: Progressing     Problem: METABOLIC, FLUID AND ELECTROLYTES - ADULT  Goal: Electrolytes maintained within normal limits  Description: INTERVENTIONS:  - Monitor labs and assess patient for signs and symptoms of electrolyte imbalances  - Administer electrolyte replacement as ordered  - Monitor response to electrolyte replacements, including repeat lab results as appropriate  - Instruct patient on fluid and nutrition as appropriate  Outcome: Progressing  Goal: Fluid balance maintained  Description: INTERVENTIONS:  - Monitor labs   - Monitor I/O and WT  - Instruct patient on fluid and nutrition as appropriate  - Assess for signs & symptoms of volume excess or deficit  Outcome: Progressing     Problem: MUSCULOSKELETAL - ADULT  Goal: Maintain proper alignment of affected body part  Description: INTERVENTIONS:  - Support, maintain and protect limb and body alignment  - Provide patient/ family with appropriate education  Outcome: Progressing     Problem: INFECTION - ADULT  Goal: Absence of fever/infection during neutropenic period  Description: INTERVENTIONS:  - Monitor WBC    Outcome: Adequate for Discharge     Problem: CARDIOVASCULAR - ADULT  Goal: Maintains optimal cardiac output and hemodynamic stability  Description: INTERVENTIONS:  - Monitor I/O, vital signs and rhythm  - Monitor for S/S and trends of decreased cardiac output  - Administer and titrate ordered vasoactive medications to optimize hemodynamic stability  - Assess quality of pulses, skin color and temperature  - Assess for signs of decreased coronary artery perfusion  - Instruct patient to report change in severity of symptoms  Outcome: Adequate for Discharge  Goal: Absence of cardiac dysrhythmias or  at baseline rhythm  Description: INTERVENTIONS:  - Continuous cardiac monitoring, vital signs, obtain 12 lead EKG if ordered  - Administer antiarrhythmic and heart rate control medications as ordered  - Monitor electrolytes and administer replacement therapy as ordered  Outcome: Adequate for Discharge

## 2024-06-20 NOTE — DISCHARGE INSTR - AVS FIRST PAGE
Dear Ame Bonilla,     It was our pleasure to care for you here at [facility].  It is our hope that we were always able to exceed the expected standards for your care during your stay.  You were hospitalized due to right sided weakness and numbness.  You were cared for on the 7th floor by Jose R Tidwell MD under the service of Mikey Shukla MD with the [facility] who covers for your primary care physician (PCP), BRAXTON Yousif, while you were hospitalized.  If you have any questions or concerns related to this hospitalization, you may contact us at .  For follow up as well as any medication refills, we recommend that you follow up with your primary care physician.  A registered nurse will reach out to you by phone within a few days after your discharge to answer any additional questions that you may have after going home.  However, at this time we provide for you here, the most important instructions / recommendations at discharge:     Notable Medication Adjustments -   Continue taking atorvastatin 40mg daily; this will most likely be adjusted or stopped by your primary care doctor  Testing Required after Discharge -   None  ** Please contact your PCP to request testing orders for any of the testing recommended here **  Important follow up information -   Follow up with Psychiatry  Follow up with Neurology in 4 weeks   Follow up with primary care doctor in 1 week (check in concerning the pineal cyst monitoring)  Other Instructions -   You are ok to go to work tomorrow per our opinion, but recommend to transition to your usual work gradually  Please review this entire after visit summary as additional general instructions including medication list, appointments, activity, diet, any pertinent wound care, and other additional recommendations from your care team that may be provided for you.      Sincerely,     Jose R Tidwell MD

## 2024-06-21 NOTE — UTILIZATION REVIEW
NOTIFICATION OF ADMISSION DISCHARGE   This is a Notification of Discharge from [facility]. Please be advised that this patient has been discharge from our facility. Below you will find the admission and discharge date and time including the patient’s disposition.   UTILIZATION REVIEW CONTACT:  Olga Lidia Ndiaye  Utilization   Network Utilization Review Department  Phone: [phone] x carefully listen to the prompts. All voicemails are confidential.  Email: [email or web address]     ADMISSION INFORMATION  PRESENTATION DATE: 6/18/2024  9:29 AM  OBERVATION ADMISSION DATE:   INPATIENT ADMISSION DATE: 6/18/24  3:14 PM   DISCHARGE DATE: 6/20/2024 12:32 PM   DISPOSITION:Home/Self Care    Network Utilization Review Department  ATTENTION: Please call with any questions or concerns to [phone] and carefully listen to the prompts so that you are directed to the right person. All voicemails are confidential.   For Discharge needs, contact Care Management DC Support Team at [phone] opt. 2  Send all requests for admission clinical reviews, approved or denied determinations and any other requests to dedicated fax number below belonging to the campus where the patient is receiving treatment. List of dedicated fax numbers for the Facilities:  FACILITY NAME UR FAX NUMBER   ADMISSION DENIALS (Administrative/Medical Necessity) [phone]   DISCHARGE SUPPORT TEAM ([facility]) [phone]   PARENT CHILD HEALTH (Maternity/NICU/Pediatrics) [phone]   [facility] [phone]   [facility] [phone]   [facility] [phone]   [facility] [phone]   [facility] [phone]   [facility] [phone]   [facility] [phone]   [facility] [phone]    [facility] [phone]   [facility] [phone]   [facility] [phone]   [facility] [phone]

## 2024-06-24 ENCOUNTER — OFFICE VISIT (OUTPATIENT)
Dept: FAMILY MEDICINE CLINIC | Facility: CLINIC | Age: 29
End: 2024-06-24

## 2024-06-24 VITALS
TEMPERATURE: 97.9 F | HEART RATE: 87 BPM | DIASTOLIC BLOOD PRESSURE: 76 MMHG | WEIGHT: 152.2 LBS | HEIGHT: 60 IN | SYSTOLIC BLOOD PRESSURE: 124 MMHG | BODY MASS INDEX: 29.88 KG/M2 | OXYGEN SATURATION: 98 % | RESPIRATION RATE: 16 BRPM

## 2024-06-24 DIAGNOSIS — F43.29 STRESS AND ADJUSTMENT REACTION: Primary | ICD-10-CM

## 2024-06-24 PROCEDURE — 99213 OFFICE O/P EST LOW 20 MIN: CPT | Performed by: FAMILY MEDICINE

## 2024-06-24 RX ORDER — ESCITALOPRAM OXALATE 5 MG/1
5 TABLET ORAL DAILY
Qty: 30 TABLET | Refills: 1 | Status: SHIPPED | OUTPATIENT
Start: 2024-06-24

## 2024-06-24 NOTE — ASSESSMENT & PLAN NOTE
Patient's current symptoms consistent with stress reaction.  Stroke ruled out in the hospital.  - Patient provided with exercises to decrease stress such as deep breathing and grounding techniques  - Atorvastatin discontinued as low risk for ASCVD at this time  - Discussed with starting medication for stress and anxiety, patient willing  - Plan to follow-up in 3 to 6 weeks for reevaluation, sooner if patient needs support

## 2024-06-24 NOTE — PROGRESS NOTES
Ambulatory Visit  Name: Ame Bonilla      : 1995      MRN: [id number]  Encounter Provider: Elias Schoen, MD  Encounter Date: 2024   Encounter department: [facility]    Assessment & Plan   1. Stress and adjustment reaction  Assessment & Plan:  Patient's current symptoms consistent with stress reaction.  Stroke ruled out in the hospital.  - Patient provided with exercises to decrease stress such as deep breathing and grounding techniques  - Atorvastatin discontinued as low risk for ASCVD at this time  - Discussed with starting medication for stress and anxiety, patient willing  - Plan to follow-up in 3 to 6 weeks for reevaluation, sooner if patient needs support    Orders:  -     escitalopram (LEXAPRO) 5 mg tablet; Take 1 tablet (5 mg total) by mouth daily         History of Present Illness     Patient is a 28-year-old female here for follow-up from hospital admission for stroke like symptoms    Patient reports right-sided numbness that she woke up to.  She was evaluated in the hospital for stroke, symptoms have since resolved.  Findings were negative for stroke.  She has slight abnormalities noted on MRI of brain: Partially empty sella and narrowing of distal bilateral transverse dural venous sinuses. Findings can be seen in patients with intracranial hypertension in the right clinical setting. 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   Patient without headache was not thought to have increased intracranial pressure.  Patient with neurology follow-up from discharge.    Patient reports taking atorvastatin daily.  She is understanding that she did not have a stroke, she thinks her symptoms are all related to her current stress.  She was told to follow-up with a psychologist but has been unable to make an appointment.  She states in the past she had significant psychiatric issues and was on multiple medications.  She does not remember the medications that she was on as a teenager.  She  was hospitalized due to these issues for approximately 3 months.    Patient does not want to go into her current life stressors but states that she is not working enough to cover her expenses.  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  Her mind keeps going even when she knows that things are okay.  She goes for walks often to try and relax.  She has 3 children ranging from 5 years old to 13 years old.  She denies suicidal thoughts or self-harm behavior.        Review of Systems   Constitutional:  Negative for chills and fever.   HENT:  Negative for sore throat.    Respiratory:  Negative for shortness of breath.    Cardiovascular:  Negative for chest pain and palpitations.   Gastrointestinal:  Negative for abdominal pain.   Genitourinary:  Negative for dysuria.   Musculoskeletal:  Negative for myalgias.   Neurological:  Negative for weakness.   Psychiatric/Behavioral:  Positive for dysphoric mood. Negative for decreased concentration, self-injury, sleep disturbance and suicidal ideas. The patient is nervous/anxious.      Past Medical History:   Diagnosis Date    Varicella     as child      Past Surgical History:   Procedure Laterality Date     SECTION      TUBAL LIGATION       Family History   Problem Relation Age of Onset    Diabetes Mother     No Known Problems Father     Diabetes Brother      Social History     Tobacco Use    Smoking status: Never     Passive exposure: Never    Smokeless tobacco: Never   Vaping Use    Vaping status: Never Used   Substance and Sexual Activity    Alcohol use: No    Drug use: No    Sexual activity: Yes     Partners: Male     Birth control/protection: Surgical     Comment: wishing to conceive     Current Outpatient Medications on File Prior to Visit   Medication Sig    atorvastatin (LIPITOR) 40 mg tablet Take 1 tablet (40 mg total) by mouth every evening     Allergies   Allergen Reactions    Latex Rash     Iodinated Contrast Media Hives    Percocet [Oxycodone-Acetaminophen] Rash     Immunization History   Administered Date(s) Administered    HPV Quadrivalent 02/22/2008    Tuberculin Skin Test-PPD Intradermal 12/13/2019     Objective     /76 (BP Location: Right arm, Patient Position: Sitting, Cuff Size: Standard)   Pulse 87   Temp 97.9 °F (36.6 °C) (Temporal)   Resp 16   Ht 5' (1.524 m)   Wt 69 kg (152 lb 3.2 oz)   LMP 06/16/2024 (Exact Date)   SpO2 98%   BMI 29.72 kg/m²     Physical Exam  Constitutional:       General: She is not in acute distress.     Appearance: Normal appearance.   Cardiovascular:      Rate and Rhythm: Normal rate.   Pulmonary:      Effort: No respiratory distress.   Musculoskeletal:         General: Normal range of motion.      Cervical back: Normal range of motion.   Neurological:      General: No focal deficit present.      Mental Status: She is alert and oriented to person, place, and time.       Administrative Statements

## 2024-06-24 NOTE — ED ATTENDING ATTESTATION
6/18/2024  I, Lefty Elizabeth MD, saw and evaluated the patient. I have discussed the patient with the resident/non-physician practitioner and agree with the resident's/non-physician practitioner's findings, Plan of Care, and MDM as documented in the resident's/non-physician practitioner's note, except where noted. All available labs and Radiology studies were reviewed.  I was present for key portions of any procedure(s) performed by the resident/non-physician practitioner and I was immediately available to provide assistance.       At this point I agree with the current assessment done in the Emergency Department.  I have conducted an independent evaluation of this patient a history and physical is as follows:    ED Course     Patient is a 28-year-old female brought in by fire rescue as a prehospital stroke alert last known normal was 9 PM last night.  Woke up at 430 this morning plan of numbness of right side of her face right arm right leg.  Patient admits to weakness on the right side as well as difficulty with walking.  Per EMS report patient had focal right arm weakness on exam.    Patient was taken immediately to CT scan as per protocol neurology was present and saw and evaluated patient immediately upon arrival to the emergency department.    Physical examination GCS is 15.  Nose intact bilaterally right arm pronator drift present 4-5 muscle strength in right arm.  No sensation of the right face leg and neck stroke scale 2.    Impression: Strokelike symptoms  Differential diagnosis ischemic stroke, hemorrhagic stroke,    CT imaging reviewed with radiology and neurology    Discussion with neurology patient not a TNKase candidate as symptoms outside appropriate time window to give lytics.  Plan to admit patient for stroke pathway as inpatient.    Critical Care Time  Procedures

## 2024-06-24 NOTE — LETTER
June 24, 2024     Patient: Ame Bonilla  YOB: 1995  Date of Visit: 6/24/2024      To Whom it May Concern:    Ame Bonilla is under my professional care. Ame was seen in my office on 6/24/2024. Ame may return to work on 6/25 .    If you have any questions or concerns, please don't hesitate to call.         Sincerely,          Elias Schoen, MD        CC: No Recipients

## 2024-07-09 ENCOUNTER — TELEPHONE (OUTPATIENT)
Dept: PSYCHIATRY | Facility: CLINIC | Age: 29
End: 2024-07-09

## 2024-07-09 NOTE — TELEPHONE ENCOUNTER
Contacted patient in regards to Routine Referral in attempts to verify patient's needs of services and add patient to proper wait list. LVM for patient to contact intake dept  in regards to referral at [phone]. Pt indicated through mychart she is interested but did not say for which service. IC LVM to call back to let us know which services she is interested in and add her to proper wait list. Second attempt. Closing referral.

## 2024-07-16 ENCOUNTER — TELEPHONE (OUTPATIENT)
Dept: OBGYN CLINIC | Facility: CLINIC | Age: 29
End: 2024-07-16

## 2024-08-16 ENCOUNTER — TELEPHONE (OUTPATIENT)
Age: 29
End: 2024-08-16

## 2024-08-16 NOTE — TELEPHONE ENCOUNTER
1ST ATTEMPT,     Called pt no answer, LMOM.    Thank you,     Shayna PAREDESU/ VALENTINO RADHA / Stroke-like symptoms    DC- HOME- 6/20/2024    Ame S Irene will need follow up in in 4 weeks with general AP/resident/Attending .  She will require a repeat MRI per outpatient followup.

## 2025-01-14 ENCOUNTER — OFFICE VISIT (OUTPATIENT)
Dept: NEUROLOGY | Facility: CLINIC | Age: 30
End: 2025-01-14
Payer: MEDICARE

## 2025-01-14 VITALS
HEIGHT: 60 IN | WEIGHT: 165.6 LBS | SYSTOLIC BLOOD PRESSURE: 124 MMHG | OXYGEN SATURATION: 98 % | BODY MASS INDEX: 32.51 KG/M2 | TEMPERATURE: 97.7 F | HEART RATE: 91 BPM | DIASTOLIC BLOOD PRESSURE: 74 MMHG | RESPIRATION RATE: 14 BRPM

## 2025-01-14 DIAGNOSIS — R29.90 STROKE-LIKE SYMPTOMS: ICD-10-CM

## 2025-01-14 DIAGNOSIS — G56.00 CARPAL TUNNEL SYNDROME: Primary | ICD-10-CM

## 2025-01-14 PROCEDURE — 99214 OFFICE O/P EST MOD 30 MIN: CPT | Performed by: PSYCHIATRY & NEUROLOGY

## 2025-01-14 NOTE — PROGRESS NOTES
Name: Ame Bonilla      : 1995      MRN: [id number]  Encounter Provider: Abdi Jhoan  Encounter Date: 2025   Encounter department: [facility]  :  Assessment & Plan  Carpal tunnel syndrome  29 year old patient presenting to our clinic for a hospital follow up. Patient reports numbness in her hands especially at night. Has been assessed for carpal tunnel in the past but was given exercises and a splint was recommended. Now patient presents with weakness in right hand  and some muscle atrophy in the right hand as well.    Assessment: suspect carpal tunnel syndrome.    Plan:  - EMG 2 upper limbs  - orthopedic surgery - hand surgery    Orders:    EMG 2 Limb Upper Extremity; Future    Ambulatory Referral to Orthopedic Surgery; Future    Stroke-like symptoms  Hospital follow up for stroke like symptoms but ultimately stroke was ruled out.    CT head without acute changes noted.    Mri Brain w/o contrast-  No acute process seen. No discrete evidence of acute or subacute infarct.   MRI Brain w/wo contrast MS protocol - No parenchymal signal abnormality or abnormal enhancement. Partially empty sella and narrowing of distal bilateral transverse dural venous sinuses. Findings can be seen in patients with intracranial hypertension in the right clinical setting. 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  MRI Cervical Spine w/wo contrast - Technically suboptimal image quality. No definite cord signal abnormality or pathologic enhancement. No significant degenerative change.  Lipid Panel HDL 48 and the rest are within normal.  Hemoglobin A1c 5.6%  Echo LVEF 65%    As discussed with patient in hospital and by her PCP her symptoms may have been a stress response.           History of Present Illness   HPI  Ame Bonilla is a 29 year old female presenting as a hospital follow up. The patient was seen in 2024 in the hospital as a stroke alert. The patient's stroke was called due to right sided  facial/arm/leg numbness/tingling. The patient had a stroke work up completed and it was negative for any acute intracranial pathologies. The patient's stroke pathway was discontinued and the patient was recommended to follow up with her PCP and psych. The patient was recommended a 1 time follow up with Neurology.    The patient states she gets the headaches 2-3 times a week. The patient states the headache is very strong and comes on rapidly and leaves rapidly. No specific activity or actions associated with it. The patient does not associate any specific body position with it. The patient has multiple stress factors in her life and has a history of some psychiatric issues in the remote past.     The patient gets numbness in her hands. She feels like this is because she has always worked with her hands. She states it happens every night when she goes to sleep. She has discussed it with her PCP and was told to do an exercise. On exam patient appears to have a weaker  on the right and some muscle atrophy is observed. Will order EMG and hand surgery referral.     services were used for this encounter.  - [facility] (#[id number]).      Pertinent Medical History     Medical History Reviewed by provider this encounter:     .  Past Medical History   Past Medical History:   Diagnosis Date    Varicella     as child      Past Surgical History:   Procedure Laterality Date     SECTION      TUBAL LIGATION       Family History   Problem Relation Age of Onset    Diabetes Mother     No Known Problems Father     Diabetes Brother       reports that she has never smoked. She has never been exposed to tobacco smoke. She has never used smokeless tobacco. She reports that she does not drink alcohol and does not use drugs.  Current Outpatient Medications on File Prior to Visit   Medication Sig Dispense Refill    escitalopram (LEXAPRO) 5 mg tablet Take 1 tablet (5 mg total) by mouth daily 30 tablet 1     No current  facility-administered medications on file prior to visit.     Allergies   Allergen Reactions    Latex Rash    Iodinated Contrast Media Hives    Percocet [Oxycodone-Acetaminophen] Rash      Current Outpatient Medications on File Prior to Visit   Medication Sig Dispense Refill    escitalopram (LEXAPRO) 5 mg tablet Take 1 tablet (5 mg total) by mouth daily 30 tablet 1     No current facility-administered medications on file prior to visit.      Social History     Tobacco Use    Smoking status: Never     Passive exposure: Never    Smokeless tobacco: Never   Vaping Use    Vaping status: Never Used   Substance and Sexual Activity    Alcohol use: No    Drug use: No    Sexual activity: Yes     Partners: Male     Birth control/protection: Surgical     Comment: wishing to conceive        Objective   /74 (BP Location: Right arm, Patient Position: Sitting, Cuff Size: Adult)   Pulse 91   Temp 97.7 °F (36.5 °C) (Temporal)   Resp 14   Ht 5' (1.524 m)   Wt 75.1 kg (165 lb 9.6 oz)   SpO2 98%   BMI 32.34 kg/m²     Physical Exam  Vitals reviewed.   Eyes:      General: Lids are normal.      Extraocular Movements: Extraocular movements intact.      Pupils: Pupils are equal, round, and reactive to light.   Neurological:      Deep Tendon Reflexes: Reflexes are normal and symmetric.       Neurological Exam  Mental Status  Awake, alert and oriented to person, place and time.    Cranial Nerves  CN II: Visual acuity is normal. Visual fields full to confrontation.  CN III, IV, VI: Extraocular movements intact bilaterally. Normal lids and orbits bilaterally. Pupils equal round and reactive to light bilaterally.  CN V: Facial sensation is normal.  CN VII: Full and symmetric facial movement.  CN VIII: Hearing is normal.  CN IX, X: Palate elevates symmetrically. Normal gag reflex.  CN XI: Shoulder shrug strength is normal.  CN XII: Tongue midline without atrophy or fasciculations.    Motor  Normal muscle bulk throughout. Normal muscle  tone. Strength is 5/5 in all four extremities except as noted.                                             Right                     Left  Elbow flexion                         4                           Elbow extension                    4                           Finger flexion                         4                           Hip flexion                              4                           Hip extension                         4                             Sensory  Light touch is normal in upper and lower extremities.   . Pinprick is normal in upper and lower extremities.     Reflexes  Deep tendon reflexes are 2+ and symmetric in all four extremities.    Coordination  Right: Finger-to-nose normal.Left: Finger-to-nose normal.      Radiology Results Review: I have reviewed the following images/report studies in PACS: No results found.

## 2025-01-20 ENCOUNTER — OFFICE VISIT (OUTPATIENT)
Dept: OBGYN CLINIC | Facility: CLINIC | Age: 30
End: 2025-01-20
Payer: MEDICARE

## 2025-01-20 DIAGNOSIS — R20.0 NUMBNESS AND TINGLING IN BOTH HANDS: ICD-10-CM

## 2025-01-20 DIAGNOSIS — R20.2 NUMBNESS AND TINGLING IN BOTH HANDS: ICD-10-CM

## 2025-01-20 PROBLEM — G56.00 CARPAL TUNNEL SYNDROME: Status: ACTIVE | Noted: 2025-01-20

## 2025-01-20 PROCEDURE — 99203 OFFICE O/P NEW LOW 30 MIN: CPT | Performed by: SURGERY

## 2025-01-20 NOTE — PROGRESS NOTES
Assessment    Right hand numbness/tingling with sensory changes      Plan    -Initiate treatment with nighttime bracing.  Cock up wrist braces dispensed in the office today.  -EMG B/L UE already ordered by neurology.  Will try to move the study date up.  -Activities as tolerated.  -F/U after EMG for further treatment planning.        Subjective     HPI    Patient ID:  Ame Bonilla is a right-hand-dominant 29 y.o. female here for evaluation of the right upper extremity.  According to the patient, about 7 months ago she experienced right whole body numbness and tingling for which she was eventually admitted to the hospital for a stroke alert.  She was found not to have an acute stroke however she did follow-up with neurology, and more recently her numbness and tingling has localized to the right forearm and hand, including all the fingers.  This is also associated with weakness with  strength of the right hand.  She is having some similar symptoms on the left side but very mild.  An EMG of the bilateral upper extremities was ordered by Neurology and she was referred here.  The patient states she has not had any formal treatment for this so far, and has not used any wrist braces.  We concentrated on the right upper extremity as this was her symptomatic side.  She denies any history of surgery to the right wrist.        The following portions of the patient's history were reviewed and updated as appropriate: allergies, current medications, past family history, past medical history, past social history, past surgical history, and problem list.    Review of Systems     Objective    Imaging:  None    Physical Exam     General appearance:  NAD   Cardiac:  Regular rate  Lungs:  Unlabored breathing  Abdomen:  Non-distended    Orthopedic Examination:  RUE    Inspection:  No open wounds or erythema.  No ecchymosis or swelling.  No muscle wasting.    Palpation:  NTTP hand/wrist    Range-of-motion:  Normal elbow, wrist  ROM, full composite fist.    Strength:  5/5 wrist flexion/extension, 4/5 , intrinsics, 5/5 thumb opposition, APB.    Sensation:  Diminished to light touch m/r/u nerve distribution compared to the contralateral side.  2-point discrimination testing:  Right side:  Thumb 13 mm  Index, middle, ring, small > 15mm    Normal 2-point discrimination testing on the left side.    Special Tests:  + Tinel's and Durkan's compression right wrist and elbow.  Palpable radial pulse  The UE is warm and well perfused.

## 2025-01-28 ENCOUNTER — ANNUAL EXAM (OUTPATIENT)
Dept: OBGYN CLINIC | Facility: CLINIC | Age: 30
End: 2025-01-28

## 2025-01-28 VITALS
HEIGHT: 60 IN | WEIGHT: 164.8 LBS | SYSTOLIC BLOOD PRESSURE: 138 MMHG | DIASTOLIC BLOOD PRESSURE: 78 MMHG | BODY MASS INDEX: 32.35 KG/M2

## 2025-01-28 DIAGNOSIS — Z71.85 HPV VACCINE COUNSELING: ICD-10-CM

## 2025-01-28 DIAGNOSIS — Z23 NEED FOR HPV VACCINE: ICD-10-CM

## 2025-01-28 DIAGNOSIS — Z01.419 ENCOUNTER FOR ANNUAL ROUTINE GYNECOLOGICAL EXAMINATION: Primary | ICD-10-CM

## 2025-01-28 DIAGNOSIS — R87.810 CERVICAL HIGH RISK HUMAN PAPILLOMAVIRUS (HPV) DNA TEST POSITIVE: ICD-10-CM

## 2025-01-28 PROCEDURE — 99395 PREV VISIT EST AGE 18-39: CPT | Performed by: NURSE PRACTITIONER

## 2025-01-28 PROCEDURE — 90471 IMMUNIZATION ADMIN: CPT | Performed by: NURSE PRACTITIONER

## 2025-01-28 PROCEDURE — G0476 HPV COMBO ASSAY CA SCREEN: HCPCS | Performed by: NURSE PRACTITIONER

## 2025-01-28 PROCEDURE — G0145 SCR C/V CYTO,THINLAYER,RESCR: HCPCS | Performed by: NURSE PRACTITIONER

## 2025-01-28 PROCEDURE — 90651 9VHPV VACCINE 2/3 DOSE IM: CPT | Performed by: NURSE PRACTITIONER

## 2025-01-28 NOTE — PROGRESS NOTES
Annual Exam    Assessment   1. Encounter for annual routine gynecological examination  Liquid-based pap, screening      2. Cervical high risk human papillomavirus (HPV) DNA test positive        3. HPV vaccine counseling        4. Need for HPV vaccine  HPV Vaccine 9 valent IM        well woman       Plan       All questions answered.  Breast self exam technique reviewed and patient encouraged to perform self-exam monthly.  Contraception: tubal ligation.  Discussed healthy lifestyle modifications.  Follow up in 1 year.  Thin prep Pap smear.     Patient Instructions   PAP results can take up to 2 weeks  Returnt he HPV second vaccine today, will return in 4 months for 3rd vaccine in 4 months   Call with needs or concerns  Annual GYN Exam in 1 year       Subjective      Ame Bonilla is a 29 y.o.  female who presents for annual well woman exam. Periods are regular every 28-30 days, lasting 7 days. No intermenstrual bleeding, spotting, or discharge.  1 partner x 3 years, denies domestic violence  Had 1 HPV vaccine  discussed the benefit, would like to have second vaccine today today  Last PAP was 2023 WNL PAP HPV 18 positive, Pt states she was too busy for colpo, discussed reason for colpo. Explained since it was more than 1 and 1/2 years, PAP would be repeated today, D/W Dr Whitney       Current contraception: tubal ligation  History of abnormal Pap smear: 2023 WNL PAP, HPV 18 positive, never had colpo, pt states she was too busy    Family history of uterine or ovarian cancer: no  Regular self breast exam: yes  History of abnormal mammogram: n/a  Family history of breast cancer: no  History of abnormal lipids: uNKNOWN  Menstrual History:  OB History          4    Para   3    Term   3            AB        Living   3         SAB        IAB        Ectopic        Multiple        Live Births   3                Menarche age: 7  Patient's last menstrual period was 2025 (approximate).        The following portions of the patient's history were reviewed and updated as appropriate: allergies, current medications, past family history, past medical history, past social history, past surgical history and problem list.    Review of Systems  Pertinent items are noted in HPI.      Objective      /78 (BP Location: Left arm, Patient Position: Sitting, Cuff Size: Large)   Ht 5' (1.524 m)   Wt 74.8 kg (164 lb 12.8 oz)   LMP 01/05/2025 (Approximate)   BMI 32.19 kg/m²     General: alert and oriented, in no acute distress, alert, appears stated age, and cooperative   Heart: NSR   Lungs: clear to auscultation bilaterally, WNL respiratory effort    Thyroid: Negative masses palpable   Abdomen: soft, non-tender, without masses or organomegaly palpable   Vulva: normal   Vagina: normal mucosa   Cervix: no bleeding following Pap, no cervical motion tenderness, and no lesions   Uterus: normal size, non-tender, normal shape and consistency   Adnexa: normal adnexa   Urethra: normal   Breasts: NT,negative masses palpable, negative discharge, or dimpling

## 2025-01-28 NOTE — PATIENT INSTRUCTIONS
PAP results can take up to 2 weeks  Returnt he HPV second vaccine today, will return in 4 months for 3rd vaccine in 4 months   Call with needs or concerns  Annual GYN Exam in 1 year

## 2025-01-28 NOTE — PROGRESS NOTES
Patient given 2nd hpv on left deltoid on 1/28/25    NDC# 2176-0595-61  LOT# [id number]  EXP# [id number]

## 2025-01-31 LAB
LAB AP GYN PRIMARY INTERPRETATION: NORMAL
Lab: NORMAL

## 2025-02-06 ENCOUNTER — RESULTS FOLLOW-UP (OUTPATIENT)
Dept: OBGYN CLINIC | Facility: CLINIC | Age: 30
End: 2025-02-06

## 2025-02-06 NOTE — TELEPHONE ENCOUNTER
----- Message from BRAXTON Gomez sent at 2/4/2025  7:51 AM EST -----  Pt speaks Czech, please notify pt PAP was WNL but HPV 18 was positive, pt needs colpo  ----- Message -----  From: Lab, Background User  Sent: 1/29/2025   8:09 PM EST  To: BRAXTON Gomez

## 2025-02-16 NOTE — ASSESSMENT & PLAN NOTE
29 year old patient presenting to our clinic for a hospital follow up. Patient reports numbness in her hands especially at night. Has been assessed for carpal tunnel in the past but was given exercises and a splint was recommended. Now patient presents with weakness in right hand  and some muscle atrophy in the right hand as well.    Assessment: suspect carpal tunnel syndrome.    Plan:  - EMG 2 upper limbs  - orthopedic surgery - hand surgery    Orders:    EMG 2 Limb Upper Extremity; Future    Ambulatory Referral to Orthopedic Surgery; Future

## 2025-02-16 NOTE — ASSESSMENT & PLAN NOTE
Hospital follow up for stroke like symptoms but ultimately stroke was ruled out.    CT head without acute changes noted.    Mri Brain w/o contrast-  No acute process seen. No discrete evidence of acute or subacute infarct.   MRI Brain w/wo contrast [facility] protocol - No parenchymal signal abnormality or abnormal enhancement. Partially empty sella and narrowing of distal bilateral transverse dural venous sinuses. Findings can be seen in patients with intracranial hypertension in the right clinical setting. 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  MRI Cervical Spine w/wo contrast - Technically suboptimal image quality. No definite cord signal abnormality or pathologic enhancement. No significant degenerative change.  Lipid Panel HDL 48 and the rest are within normal.  Hemoglobin A1c 5.6%  Echo LVEF 65%    As discussed with patient in hospital and by her PCP her symptoms may have been a stress response.

## 2025-03-25 ENCOUNTER — TELEPHONE (OUTPATIENT)
Dept: OBGYN CLINIC | Facility: CLINIC | Age: 30
End: 2025-03-25

## 2025-05-02 ENCOUNTER — HOSPITAL ENCOUNTER (OUTPATIENT)
Dept: NEUROLOGY | Facility: CLINIC | Age: 30
End: 2025-05-02
Payer: MEDICARE

## 2025-05-02 DIAGNOSIS — G56.00 CARPAL TUNNEL SYNDROME: ICD-10-CM

## 2025-05-02 PROCEDURE — 95912 NRV CNDJ TEST 11-12 STUDIES: CPT | Performed by: PSYCHIATRY & NEUROLOGY

## 2025-05-02 PROCEDURE — 95886 MUSC TEST DONE W/N TEST COMP: CPT | Performed by: PSYCHIATRY & NEUROLOGY

## 2025-05-20 ENCOUNTER — PROCEDURE VISIT (OUTPATIENT)
Dept: OBGYN CLINIC | Facility: CLINIC | Age: 30
End: 2025-05-20

## 2025-05-20 VITALS
DIASTOLIC BLOOD PRESSURE: 60 MMHG | BODY MASS INDEX: 31.41 KG/M2 | WEIGHT: 160 LBS | HEIGHT: 60 IN | SYSTOLIC BLOOD PRESSURE: 120 MMHG

## 2025-05-20 DIAGNOSIS — R87.810 CERVICAL HIGH RISK HUMAN PAPILLOMAVIRUS (HPV) DNA TEST POSITIVE: Primary | ICD-10-CM

## 2025-05-20 PROCEDURE — 88305 TISSUE EXAM BY PATHOLOGIST: CPT | Performed by: PATHOLOGY

## 2025-05-20 PROCEDURE — 88344 IMHCHEM/IMCYTCHM EA MLT ANTB: CPT | Performed by: PATHOLOGY

## 2025-05-20 NOTE — PROGRESS NOTES
Colposcopy    Date/Time: 5/20/2025 1:30 PM    Performed by: Smiley Briscoe MD  Authorized by: Smiley Briscoe MD    Other Assisting Provider: Yes (comment) (Toussaint)    Verbal consent obtained?: Yes    Written consent obtained?: Yes    Risks and benefits: Risks, benefits and alternatives were discussed    Consent given by:  Patient  Patient states understanding of procedure being performed: Yes    Patient's understanding of procedure matches consent: Yes    Procedure consent matches procedure scheduled: Yes    Relevant documents present and verified: Yes    Test results available and properly labeled: Yes    Required items: Required blood products, implants, devices and special equipment available    Patient identity confirmed:  Verbally with patient  Pre-procedure:     Prepped with: acetic acid    Indication:     Indications: [id number].  Procedure:     Procedure: Colposcopy w/ cervical biopsy and ECC      Under satisfactory analgesia the patient was prepped and draped in the dorsal lithotomy position: yes      Lima speculum was placed in the vagina: yes      Under colposcopic examination the transition zone was seen in entirety: no      Endocervix was curetted using a Kevorkian curette: yes      Cervical biopsy performed with a cervical biopsy punch: yes      Specimen(s) to pathology: yes    Post-procedure:     Impression: Low grade cervical dysplasia      Patient tolerance of procedure:  Tolerated well, no immediate complications  Comments:      Cervix appeared normal with no acetowhite changes. Transition zone not visualized.

## 2025-05-28 ENCOUNTER — CLINICAL SUPPORT (OUTPATIENT)
Dept: OBGYN CLINIC | Facility: CLINIC | Age: 30
End: 2025-05-28

## 2025-05-28 VITALS
OXYGEN SATURATION: 99 % | HEIGHT: 60 IN | DIASTOLIC BLOOD PRESSURE: 70 MMHG | BODY MASS INDEX: 31.45 KG/M2 | WEIGHT: 160.2 LBS | HEART RATE: 90 BPM | SYSTOLIC BLOOD PRESSURE: 120 MMHG

## 2025-05-28 DIAGNOSIS — Z23 NEED FOR HPV VACCINE: Primary | ICD-10-CM

## 2025-05-28 PROCEDURE — 90651 9VHPV VACCINE 2/3 DOSE IM: CPT

## 2025-05-28 PROCEDURE — 90471 IMMUNIZATION ADMIN: CPT
